# Patient Record
Sex: FEMALE | Race: OTHER | HISPANIC OR LATINO
[De-identification: names, ages, dates, MRNs, and addresses within clinical notes are randomized per-mention and may not be internally consistent; named-entity substitution may affect disease eponyms.]

---

## 2019-07-19 ENCOUNTER — APPOINTMENT (OUTPATIENT)
Dept: GASTROENTEROLOGY | Facility: CLINIC | Age: 74
End: 2019-07-19
Payer: MEDICARE

## 2019-07-19 VITALS
OXYGEN SATURATION: 99 % | WEIGHT: 134 LBS | DIASTOLIC BLOOD PRESSURE: 72 MMHG | HEIGHT: 62 IN | TEMPERATURE: 98.2 F | HEART RATE: 69 BPM | BODY MASS INDEX: 24.66 KG/M2 | SYSTOLIC BLOOD PRESSURE: 129 MMHG

## 2019-07-19 DIAGNOSIS — Z80.0 FAMILY HISTORY OF MALIGNANT NEOPLASM OF DIGESTIVE ORGANS: ICD-10-CM

## 2019-07-19 DIAGNOSIS — Z86.79 PERSONAL HISTORY OF OTHER DISEASES OF THE CIRCULATORY SYSTEM: ICD-10-CM

## 2019-07-19 DIAGNOSIS — R11.0 NAUSEA: ICD-10-CM

## 2019-07-19 DIAGNOSIS — K29.30 CHRONIC SUPERFICIAL GASTRITIS W/OUT BLEEDING: ICD-10-CM

## 2019-07-19 DIAGNOSIS — K58.0 IRRITABLE BOWEL SYNDROME WITH DIARRHEA: ICD-10-CM

## 2019-07-19 DIAGNOSIS — Z78.9 OTHER SPECIFIED HEALTH STATUS: ICD-10-CM

## 2019-07-19 DIAGNOSIS — Z87.19 PERSONAL HISTORY OF OTHER DISEASES OF THE DIGESTIVE SYSTEM: ICD-10-CM

## 2019-07-19 DIAGNOSIS — K64.8 OTHER HEMORRHOIDS: ICD-10-CM

## 2019-07-19 DIAGNOSIS — K76.0 FATTY (CHANGE OF) LIVER, NOT ELSEWHERE CLASSIFIED: ICD-10-CM

## 2019-07-19 DIAGNOSIS — R19.7 DIARRHEA, UNSPECIFIED: ICD-10-CM

## 2019-07-19 DIAGNOSIS — R07.89 OTHER CHEST PAIN: ICD-10-CM

## 2019-07-19 PROBLEM — Z00.00 ENCOUNTER FOR PREVENTIVE HEALTH EXAMINATION: Status: ACTIVE | Noted: 2019-07-19

## 2019-07-19 PROCEDURE — 99204 OFFICE O/P NEW MOD 45 MIN: CPT

## 2019-07-19 RX ORDER — DOXEPIN HYDROCHLORIDE 25 MG/1
25 CAPSULE ORAL
Qty: 60 | Refills: 0 | Status: ACTIVE | COMMUNITY
Start: 2019-02-15

## 2019-07-19 RX ORDER — AMLODIPINE BESYLATE 10 MG/1
10 TABLET ORAL
Qty: 90 | Refills: 0 | Status: ACTIVE | COMMUNITY
Start: 2019-03-30

## 2019-07-19 RX ORDER — SUCRALFATE 1 G/10ML
1 SUSPENSION ORAL
Qty: 1200 | Refills: 3 | Status: ACTIVE | COMMUNITY
Start: 2019-07-19 | End: 1900-01-01

## 2019-07-19 RX ORDER — ALPRAZOLAM 0.25 MG/1
0.25 TABLET ORAL
Qty: 90 | Refills: 0 | Status: ACTIVE | COMMUNITY
Start: 2019-06-21

## 2019-07-19 RX ORDER — AMITRIPTYLINE HYDROCHLORIDE 10 MG/1
10 TABLET, FILM COATED ORAL
Refills: 0 | Status: ACTIVE | COMMUNITY

## 2019-07-19 RX ORDER — LOSARTAN POTASSIUM 100 MG/1
100 TABLET, FILM COATED ORAL
Qty: 90 | Refills: 0 | Status: ACTIVE | COMMUNITY
Start: 2019-03-30

## 2019-07-19 NOTE — REVIEW OF SYSTEMS
[Chest Pain] : chest pain [Abdominal Pain] : abdominal pain [Vomiting] : vomiting [Diarrhea] : diarrhea [Heartburn] : heartburn [Arthralgias] : arthralgias [Anxiety] : anxiety [Depression] : depression [Hot Flashes] : hot flashes [Negative] : Heme/Lymph

## 2019-07-19 NOTE — ASSESSMENT
[FreeTextEntry1] : Abdominal Pain: The patient complains of abdominal pain. The patient is to avoid nonsteroidal anti-inflammatory drugs and aspirin.  I recommend a trial of Carafate suspension 1 gram/10 cc PO 4 times a day for 3 months for the symptoms.\par Dyspepsia: The patient complains of dyspeptic symptoms.  The patient was advised to abide by an anti-gas diet.  The patient was given a pamphlet for anti-gas.  The patient and I reviewed the anti-gas diet at length. The patient is to start on a trial of Phazyme one tablet 3 times a day p.r.n. abdominal pain and gas.\par GERD: The patient was advised to avoid late-night meals and dietary indiscretions.  The patient was advised to avoid fried and fatty foods.  The patient was advised to abide by an anti-GERD diet. The patient was given a pamphlet for anti-GERD.  The patient and I reviewed the anti-GERD diet at length. I recommend a trial of Carafate suspension 1 gram/10 cc 4 times a day x 3 months for the symptoms.\par Nausea: The patient complains of nausea. If the symptoms of nausea persists, the patient may require a trial of Zofran 8 mg twice a day. \par Diarrhea: I recommend a low residue diet. The patient is to avoid fiber supplementation. The patient is to consider starting a trial of a probiotic such as Align once a day. \par Atypical Chest Pain: The patient complains of atypical chest pain of unclear etiology.  The patient was advised to follow up with the PMD and cardiologist regarding evaluation for the atypical chest pain. The patient was told of possible etiologies such as cardiac, pulmonary, GI, musculoskeletal, stress and other causes for the atypical chest pain.  The patient agrees and will follow-up with the PMD and cardiologist. \par The patient was also advised to follow up with her psychiatrist.\par Hiatal Hernia:  The patient was advised to avoid late-night meals and dietary indiscretions.  The patient was advised to avoid fried and fatty foods.  The patient was advised to abide by an anti-GERD diet. The patient was given a pamphlet for anti-GERD.  The patient and I reviewed the anti-GERD diet at length. I recommend a trial of Carafate suspension 1 gram/10 cc 4 times a day x 3 months for the symptoms.\par Gastritis: The patient has a history of gastritis. The patient is to avoid nonsteroidal anti-inflammatory drugs and aspirin. I recommend a trial of Carafate suspension 1 gram/10 cc 4 times a day x 3 months for the symptoms.\par Internal Hemorrhoids: The patient is to be started on a trial of Anusol H. C. suppositories one per rectum nightly and Anusol HC2 .5% cream apply to affected area twice a day p.r.n. hemorrhoidal bleeding or pain. \par Fatty Liver:  The patient had an imaging study (MRI) suggestive of fatty infiltration of the liver.  The patient denies any jaundice or pruritus.  The patient denies any alcohol use.  The patient denies taking large doses of nonsteroidal anti-inflammatory drugs or acetaminophen.  The findings are suggestive of fatty liver.  The patient and I had a long discussion regarding the risks of fatty liver and possible progression to cirrhosis.  The patient was told of the possible increased risk of developing liver failure, cirrhosis, ascites, GI bleeding secondary to varices, hepatic encephalopathy, bleeding tendencies and liver cancer.  The patient was told of the importance of follow-up.  The patient was advised to follow up every 6 months for blood work and imaging studies. The patient agreed and will follow up. The patient was advised to lose weight. I recommend a trial of vitamin E supplementation for the fatty liver.  If the liver enzymes remain elevated, the patient may require a trial of Pioglitazone for the fatty liver. I recommend avoid alcohol and hepato-toxic agents.  The patient was also advised to avoid NSAIDs, Acetaminophen and any other hepatotoxic drugs. The patient was also advised not to share needles, razors, scissors, nail clippers, etc.. The patient is to continue close follow-up in our office for blood work and exams.  If the liver enzymes remain elevated, the patient may require a CT guided liver biopsy to assess the liver parenchyma for possible treatment.  We had a long discussion regarding the risks and benefits of the procedure.  The patient was told of the risks of bleeding, perforation, infections, emergency surgery and missing lesions.  The patient agreed and will follow-up to reassess the symptoms.  I recommend a CBC, SMA 24, amylase, lipase, ESR, TFTs, ANATOLY, rheumatoid factor, celiac sprue panel, IgA, profile for hepatitis A, B, C. ,AFP, alpha 1 anti-trypsin  antibody, ceruloplasmin level, iron, TIBC, ferritin level, AMA, anti smooth muscle antibody and PT/INR/PTT in 6 months.  I recommend an abdominal ultrasound of the liver to assess the liver parenchyma and for liver lesions in 6 months.\par Irritable Bowel Syndrome: The patient is to be started on a trial of Phazyme 1 tablet 3 times a day p.r.n. abdominal pain.  The patient was also advised to follow a low FOD-MAP diet for the irritable bowel syndrome.  if the symptoms persist, the patient  may require a trial of Xifaxin for possible post infectious irritable bowel syndrome. \par Upper Endoscopy: I recommend an upper endoscopy to assess the symptoms.  The patient was told of the risks and benefits of the procedure.  The patient was told of the risks of perforation, emergency surgery, bleeding, infections and missed lesions.  The patient agreed and will schedule for the procedure. The patient can take the antihypertensive medication with a sip of water one hour prior to the procedure.  The patient is to be n.p.o. after midnight.  The patient is to return for the procedure. \par Follow-up: The patient is to follow-up in the office in 2 to 3 weeks to reassess the symptoms. The patient was told to call the office if any further problems. \par \par \par \par \par

## 2019-07-19 NOTE — HISTORY OF PRESENT ILLNESS
[None] : had no significant interval events [Vomiting] : denies vomiting [Constipation] : denies constipation [Yellow Skin Or Eyes (Jaundice)] : denies jaundice [Rectal Pain] : denies rectal pain [Heartburn] : heartburn [Nausea] : nausea [Diarrhea] : diarrhea [Abdominal Pain] : abdominal pain [Abdominal Swelling] : abdominal swelling [GERD] : gastroesophageal reflux disease [Irritable Bowel Syndrome] : irritable bowel syndrome [Malignancy] : malignancy [Cholecystectomy] : cholecystectomy [Wt Gain ___ Lbs] : no recent weight gain [Wt Loss ___ Lbs] : no recent weight loss [Hiatus Hernia] : no hiatus hernia [Peptic Ulcer Disease] : no peptic ulcer disease [Pancreatitis] : no pancreatitis [Cholelithiasis] : no cholelithiasis [Kidney Stone] : no kidney stone [Inflammatory Bowel Disease] : no inflammatory bowel disease [Diverticulitis] : no diverticulitis [Alcohol Abuse] : no alcohol abuse [Abdominal Surgery] : no abdominal surgery [Appendectomy] : no appendectomy [de-identified] : The patient denies any prior exposure to hepatitis A, B or C.  The patient denies any large doses of nonsteroidal anti-inflammatory drugs or acetaminophen.   The patient denies sharing needles, razors, nail clippers, nail files, scissors, et cetera.  The patient denies any EtOH abuse, cocaine use or intravenous drug use.   The patient denies any prior blood transfusions, sexual indiscretions, tattoos or piercing.  The patient admits to having prior surgery.  The history of surgery is significant for a prior cholecystectomy and hysterectomy.   The patient denies any family history of GI or liver problems.   [de-identified] : The patient is a 74-year-old  female with past medical history significant for hypertension who was referred to my office by Dr. María Gallagher for abdominal pain, dyspepsia, gastroesophageal reflux disease, atypical chest pain and nausea. The patient also admits to having diarrhea, change in bowel habits and change in caliber of stool. I was asked to render an opinion for consultation for the above complaints.   The patient states that she is feeling uncomfortable x 2 years.  The patient denies any prior exposure to hepatitis A, B or C.  The patient denies any large doses of nonsteroidal anti-inflammatory drugs or acetaminophen.   The patient denies sharing needles, razors, nail clippers, nail files, scissors, et cetera.  The patient denies any EtOH abuse, cocaine use or intravenous drug use.   The patient denies any prior blood transfusions, sexual indiscretions, tattoos or piercing.  The patient admits to having prior surgery.  The history of surgery is significant for a prior cholecystectomy and hysterectomy.   The patient admits to a family history of GI problems.  The patient’s paternal aunt had a history of gastric and reflux issue. The patient complains of abdominal pain.  The patient describes the abdominal pain as a, crampy, burning, constant midepigastric and periumbilical discomfort that is nonradiating in nature.  The abdominal pain is unrelated to meals or passing gas or having bowel movements.  The abdominal pain is described as being moderate in nature.  The abdominal pain occurs in the morning and during the day.  The abdominal pain can occur at any time.   The abdominal pain never has awakened the patient from sleep.  The abdominal pain is slightly relieved with certain medication such as Xanax and Pepcid.  The abdominal pain is associated with abdominal gas and bloating.  The patient complains of nausea but denies any vomiting.   The patient complains of gastroesophageal reflux disease but denies any dysphagia. The gastroesophageal reflux disease is worse after meals and late at night and in the early morning. The gastroesophageal reflux disease is minimally improved with proton pump inhibitors, H2 blockers and antacids.  The patient complains of atypical chest pain but denies any shortness of breath or palpitations.  The chest pain is described as a crampy, burning, intermittent substernal discomfort that is nonradiating in nature.  The patient admits to occasional episodes of diaphoresis.  The chest pain is described as being 6 out of 10 in intensity.  The chest pain can occur at any time.  The chest pain is worse in the morning.  The chest pain is worse with stress.  The chest pain is unrelated to meals and passing gas.  The chest pain never awakened the patient from sleep.  The patient complains of diarrhea but denies any constipation.  The patient has 2 to 3 bowel movements a day.  The patient complains of a change in bowel habits.  The patient complains of a change in caliber of stool.   The diarrhea is described as soft in nature.  The patient denies having mucus discharge with the bowel movements.  The patient denies any bright red blood per rectum, melena or hematemesis.  The patient denies any rectal pain or rectal pruritus. The patient complains of weight loss but denies any anorexia.  The patient admits to losing 5 pounds over the past 7 months.  She denies any fevers or chills.  The patient denies any jaundice or pruritus.  The patient denies any back pain.  The patient admits to having a prior upper endoscopy and colonoscopy performed by another gastroenterologist, Dr. Kuldip Alston.    The upper endoscopy performed 2017 revealed a small hiatal hernia and moderate gastritis. The pathology revealed gastric antral mucosa with minimal chronic inactive gastritis that was negative for Helicobacter pylori. The colonoscopy to the cecum performed on 2017 revealed a normal colon and internal hemorrhoids.  The abdominal ultrasound performed on 2017 revealed status post cholecystectomy, dilated common bile duct which could be secondary to the previous biliary surgery and no evidence of renal pathology.  The MRI of the abdomen and pancreas with and without IV contrast revealed hepatic steatosis, mild intrahepatic ductal dilatation with prominent common bile duct measuring 8 mm likely related to postcholecystectomy state, status post cholecystectomy with no evidence of choledocholithiasis. The patient's last menstrual period was age 40, s/p hysterectomy an oophorectomy. The patient is a .  The patient's first menstrual period was at age 12. The patient admits to a family history of GI problems.  The patient’s paternal aunt had a history of gastric and reflux issue.

## 2019-07-22 LAB — HEMOCCULT STL QL: NEGATIVE

## 2019-08-13 ENCOUNTER — RX CHANGE (OUTPATIENT)
Age: 74
End: 2019-08-13

## 2019-08-13 RX ORDER — POLYETHYLENE GLYCOL 3350, SODIUM CHLORIDE, SODIUM BICARBONATE AND POTASSIUM CHLORIDE WITH LEMON FLAVOR 420; 11.2; 5.72; 1.48 G/4L; G/4L; G/4L; G/4L
420 POWDER, FOR SOLUTION ORAL
Qty: 1 | Refills: 0 | Status: ACTIVE | COMMUNITY
Start: 2019-08-13 | End: 1900-01-01

## 2019-08-15 ENCOUNTER — OUTPATIENT (OUTPATIENT)
Dept: OUTPATIENT SERVICES | Facility: HOSPITAL | Age: 74
LOS: 1 days | End: 2019-08-15
Payer: COMMERCIAL

## 2019-08-15 ENCOUNTER — RESULT REVIEW (OUTPATIENT)
Age: 74
End: 2019-08-15

## 2019-08-15 ENCOUNTER — APPOINTMENT (OUTPATIENT)
Dept: GASTROENTEROLOGY | Facility: HOSPITAL | Age: 74
End: 2019-08-15

## 2019-08-15 DIAGNOSIS — K21.9 GASTRO-ESOPHAGEAL REFLUX DISEASE WITHOUT ESOPHAGITIS: ICD-10-CM

## 2019-08-15 DIAGNOSIS — K44.9 DIAPHRAGMATIC HERNIA WITHOUT OBSTRUCTION OR GANGRENE: ICD-10-CM

## 2019-08-15 PROCEDURE — 45380 COLONOSCOPY AND BIOPSY: CPT

## 2019-08-15 PROCEDURE — 88312 SPECIAL STAINS GROUP 1: CPT

## 2019-08-15 PROCEDURE — 43239 EGD BIOPSY SINGLE/MULTIPLE: CPT

## 2019-08-15 PROCEDURE — 88305 TISSUE EXAM BY PATHOLOGIST: CPT | Mod: 26

## 2019-08-15 PROCEDURE — 88305 TISSUE EXAM BY PATHOLOGIST: CPT

## 2019-08-15 PROCEDURE — 88312 SPECIAL STAINS GROUP 1: CPT | Mod: 26

## 2019-08-19 LAB — SURGICAL PATHOLOGY STUDY: SIGNIFICANT CHANGE UP

## 2019-08-28 ENCOUNTER — OUTPATIENT (OUTPATIENT)
Dept: OUTPATIENT SERVICES | Facility: HOSPITAL | Age: 74
LOS: 1 days | Discharge: HOME | End: 2019-08-28
Payer: MEDICARE

## 2019-08-28 PROCEDURE — 90792 PSYCH DIAG EVAL W/MED SRVCS: CPT

## 2019-08-29 DIAGNOSIS — F41.1 GENERALIZED ANXIETY DISORDER: ICD-10-CM

## 2019-08-29 DIAGNOSIS — F32.9 MAJOR DEPRESSIVE DISORDER, SINGLE EPISODE, UNSPECIFIED: ICD-10-CM

## 2019-11-11 ENCOUNTER — APPOINTMENT (OUTPATIENT)
Dept: GASTROENTEROLOGY | Facility: CLINIC | Age: 74
End: 2019-11-11

## 2020-10-05 ENCOUNTER — APPOINTMENT (OUTPATIENT)
Dept: GASTROENTEROLOGY | Facility: CLINIC | Age: 75
End: 2020-10-05

## 2021-04-16 ENCOUNTER — EMERGENCY (EMERGENCY)
Facility: HOSPITAL | Age: 76
LOS: 1 days | Discharge: HOME | End: 2021-04-16
Attending: EMERGENCY MEDICINE
Payer: COMMERCIAL

## 2021-04-16 VITALS
HEART RATE: 79 BPM | DIASTOLIC BLOOD PRESSURE: 79 MMHG | TEMPERATURE: 98 F | OXYGEN SATURATION: 99 % | RESPIRATION RATE: 20 BRPM | SYSTOLIC BLOOD PRESSURE: 177 MMHG | HEIGHT: 62.6 IN | WEIGHT: 136.03 LBS

## 2021-04-16 DIAGNOSIS — Z90.49 ACQUIRED ABSENCE OF OTHER SPECIFIED PARTS OF DIGESTIVE TRACT: Chronic | ICD-10-CM

## 2021-04-16 DIAGNOSIS — Z90.710 ACQUIRED ABSENCE OF BOTH CERVIX AND UTERUS: Chronic | ICD-10-CM

## 2021-04-16 LAB
ALBUMIN SERPL ELPH-MCNC: 4.3 G/DL — SIGNIFICANT CHANGE UP (ref 3.3–5)
ALP SERPL-CCNC: 84 U/L — SIGNIFICANT CHANGE UP (ref 40–120)
ALT FLD-CCNC: 30 U/L — SIGNIFICANT CHANGE UP (ref 10–45)
ANION GAP SERPL CALC-SCNC: 12 MMOL/L — SIGNIFICANT CHANGE UP (ref 5–17)
APTT BLD: 29.9 SEC — SIGNIFICANT CHANGE UP (ref 27.5–35.5)
AST SERPL-CCNC: 21 U/L — SIGNIFICANT CHANGE UP (ref 10–40)
BASOPHILS # BLD AUTO: 0.05 K/UL — SIGNIFICANT CHANGE UP (ref 0–0.2)
BASOPHILS NFR BLD AUTO: 0.6 % — SIGNIFICANT CHANGE UP (ref 0–2)
BILIRUB SERPL-MCNC: 0.2 MG/DL — SIGNIFICANT CHANGE UP (ref 0.2–1.2)
BUN SERPL-MCNC: 16 MG/DL — SIGNIFICANT CHANGE UP (ref 7–23)
CALCIUM SERPL-MCNC: 9.4 MG/DL — SIGNIFICANT CHANGE UP (ref 8.4–10.5)
CHLORIDE SERPL-SCNC: 105 MMOL/L — SIGNIFICANT CHANGE UP (ref 96–108)
CO2 SERPL-SCNC: 24 MMOL/L — SIGNIFICANT CHANGE UP (ref 22–31)
CREAT SERPL-MCNC: 0.69 MG/DL — SIGNIFICANT CHANGE UP (ref 0.5–1.3)
EOSINOPHIL # BLD AUTO: 0.04 K/UL — SIGNIFICANT CHANGE UP (ref 0–0.5)
EOSINOPHIL NFR BLD AUTO: 0.5 % — SIGNIFICANT CHANGE UP (ref 0–6)
GAS PNL BLDV: SIGNIFICANT CHANGE UP
GLUCOSE SERPL-MCNC: 147 MG/DL — HIGH (ref 70–99)
HCT VFR BLD CALC: 42.5 % — SIGNIFICANT CHANGE UP (ref 34.5–45)
HGB BLD-MCNC: 14.3 G/DL — SIGNIFICANT CHANGE UP (ref 11.5–15.5)
IMM GRANULOCYTES NFR BLD AUTO: 0.4 % — SIGNIFICANT CHANGE UP (ref 0–1.5)
INR BLD: 1.1 RATIO — SIGNIFICANT CHANGE UP (ref 0.88–1.16)
LIDOCAIN IGE QN: 53 U/L — SIGNIFICANT CHANGE UP (ref 7–60)
LYMPHOCYTES # BLD AUTO: 1.67 K/UL — SIGNIFICANT CHANGE UP (ref 1–3.3)
LYMPHOCYTES # BLD AUTO: 19.8 % — SIGNIFICANT CHANGE UP (ref 13–44)
MCHC RBC-ENTMCNC: 30.5 PG — SIGNIFICANT CHANGE UP (ref 27–34)
MCHC RBC-ENTMCNC: 33.6 GM/DL — SIGNIFICANT CHANGE UP (ref 32–36)
MCV RBC AUTO: 90.6 FL — SIGNIFICANT CHANGE UP (ref 80–100)
MONOCYTES # BLD AUTO: 0.47 K/UL — SIGNIFICANT CHANGE UP (ref 0–0.9)
MONOCYTES NFR BLD AUTO: 5.6 % — SIGNIFICANT CHANGE UP (ref 2–14)
NEUTROPHILS # BLD AUTO: 6.18 K/UL — SIGNIFICANT CHANGE UP (ref 1.8–7.4)
NEUTROPHILS NFR BLD AUTO: 73.1 % — SIGNIFICANT CHANGE UP (ref 43–77)
NRBC # BLD: 0 /100 WBCS — SIGNIFICANT CHANGE UP (ref 0–0)
PLATELET # BLD AUTO: 306 K/UL — SIGNIFICANT CHANGE UP (ref 150–400)
POTASSIUM SERPL-MCNC: 3.8 MMOL/L — SIGNIFICANT CHANGE UP (ref 3.5–5.3)
POTASSIUM SERPL-SCNC: 3.8 MMOL/L — SIGNIFICANT CHANGE UP (ref 3.5–5.3)
PROT SERPL-MCNC: 7.7 G/DL — SIGNIFICANT CHANGE UP (ref 6–8.3)
PROTHROM AB SERPL-ACNC: 13.1 SEC — SIGNIFICANT CHANGE UP (ref 10.6–13.6)
RBC # BLD: 4.69 M/UL — SIGNIFICANT CHANGE UP (ref 3.8–5.2)
RBC # FLD: 12.4 % — SIGNIFICANT CHANGE UP (ref 10.3–14.5)
SODIUM SERPL-SCNC: 141 MMOL/L — SIGNIFICANT CHANGE UP (ref 135–145)
TROPONIN T, HIGH SENSITIVITY RESULT: <6 NG/L — SIGNIFICANT CHANGE UP (ref 0–51)
WBC # BLD: 8.44 K/UL — SIGNIFICANT CHANGE UP (ref 3.8–10.5)
WBC # FLD AUTO: 8.44 K/UL — SIGNIFICANT CHANGE UP (ref 3.8–10.5)

## 2021-04-16 PROCEDURE — 93010 ELECTROCARDIOGRAM REPORT: CPT

## 2021-04-16 PROCEDURE — 99284 EMERGENCY DEPT VISIT MOD MDM: CPT

## 2021-04-16 PROCEDURE — 71045 X-RAY EXAM CHEST 1 VIEW: CPT | Mod: 26

## 2021-04-16 PROCEDURE — 74174 CTA ABD&PLVS W/CONTRAST: CPT | Mod: 26,MA

## 2021-04-16 PROCEDURE — 99213 OFFICE O/P EST LOW 20 MIN: CPT

## 2021-04-16 RX ORDER — FAMOTIDINE 10 MG/ML
20 INJECTION INTRAVENOUS ONCE
Refills: 0 | Status: COMPLETED | OUTPATIENT
Start: 2021-04-16 | End: 2021-04-16

## 2021-04-16 RX ORDER — ACETAMINOPHEN 500 MG
975 TABLET ORAL ONCE
Refills: 0 | Status: COMPLETED | OUTPATIENT
Start: 2021-04-16 | End: 2021-04-16

## 2021-04-16 RX ADMIN — Medication 30 MILLILITER(S): at 23:27

## 2021-04-16 RX ADMIN — Medication 975 MILLIGRAM(S): at 20:33

## 2021-04-16 RX ADMIN — FAMOTIDINE 20 MILLIGRAM(S): 10 INJECTION INTRAVENOUS at 23:27

## 2021-04-16 NOTE — ED PROVIDER NOTE - ATTENDING CONTRIBUTION TO CARE
attending Raul: 76yF h/o HTN p/w post prandial epigastric pain, intermittent, x 1 month, worse over last 2 days. Currently without pain. Concern for mesenteric ischemia vs possible ulcer. Will obtain labs including lactate, CTA abdomen/pel, reassess

## 2021-04-16 NOTE — ED ADULT NURSE NOTE - OBJECTIVE STATEMENT
75 yo F pmh of ... ambulated to ED c/o epigastric pain since yesterday worse after eating food.  Pt also endorses nausea intermittently.  Denies CP, back pain, SOB, fevers/chills, n/v/d, lightheadedness, dizziness, changes in urinary or bowel habits.  A&Ox4, gross neuro intact, abdomen soft, nondistended, tender to palpation in the epigastric area.  Skin w/d/i.  Safety and comfort maintained. will continue to monitor.

## 2021-04-16 NOTE — ED PROVIDER NOTE - RAPID ASSESSMENT
76y F p/w midline abd pain from epigastric to lower abd x 2 days that is a/w eating. Denies F/C, vomiting, melena, blood in stool, diarrhea, dysuria, hematuria, CP, SOB, jaw pain, arm pain. No daily    medications. No EtOH use. She has had similar episodes before. No h/o HTN, DM. Allergic to codeine.    IDon (Scribe), have documented this rapid assessment note under the dictation of Eric Abreu (HAYDER), which has been reviewed and affirmed to be accurate.  Patient was seen in the waiting room as a QPA patient. The patient will be seen and further worked up in the main emergency department and their care will be completed by the main emergency department team along with a thorough physical exam. Receiving team will follow up on labs, analgesia, any clinical imaging, reassess and disposition as clinically indicated, all decisions regarding the progression of care will be made at their discretion.    Scribe Statement: IDon, attest that this documentation has been prepared under the direction and in the presence of Eric Abreu (HAYDER) 76y F p/w midline abd pain from epigastric to lower abd x 2 days that is a/w eating. Denies F/C, vomiting, melena, blood in stool, diarrhea, dysuria, hematuria, CP, SOB, jaw pain, arm pain. No daily    medications. No EtOH use. She has had similar episodes before. No h/o HTN, DM. Allergic to codeine.    IDon (Scribe), have documented this rapid assessment note under the dictation of Eric Abreu (HAYDER), which has been reviewed and affirmed to be accurate.  Patient was seen in the waiting room as a QPA patient. The patient will be seen and further worked up in the main emergency department and their care will be completed by the main emergency department team along with a thorough physical exam. Receiving team will follow up on labs, analgesia, any clinical imaging, reassess and disposition as clinically indicated, all decisions regarding the progression of care will be made at their discretion.    Scribe Statement: IDon, attest that this documentation has been prepared under the direction and in the presence of Eric Abreu (HAYDER)    Eric OLSON PA-C, personally performed the service described in the documentation recorded by the scribe in my presence, and it accurately and completely records my words and actions.

## 2021-04-16 NOTE — ED PROVIDER NOTE - PHYSICAL EXAMINATION
PHYSICAL EXAM:   General: well-appearing, appears stated age, not in extremis   HEENT: NC/AT, airway patent  Cardiovascular: regular rate   Respiratory:  nonlabored respirations  Abdominal: soft, nontender, nondistended, no rebound, guarding or rigidity  Neuro: awake, alert, interactive  Psychiatric: appropriate mood and affect.   -Mei Chacon PGY-3

## 2021-04-16 NOTE — ED PROVIDER NOTE - PROGRESS NOTE DETAILS
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 OBGYN paged. Schuyler Hurtado, DO PGY-1: As per OBGYN no surgical workup indicated at this time Mei Chacon MD PGY-3 results of CT discussed with patient, including ovarian vein thrombus and breast nodule. patient states she had a breast nodule biopsied x 20 years ago which was unremarkable at that time. possibly will keep in cdu for heme consult/initiation of AC

## 2021-04-16 NOTE — ED ADULT NURSE NOTE - CAS EDN DISCHARGE INTERVENTIONS
Have her take the 2 pills as early in day as possible and see if this helps  
I called and advised patient that she should  take the 2 pills as early in day as possible and see if this helps.  Patient verbalized understanding.   
Pt would like to know what time of day she should be taking Fluoxetine 10 mg, qty of 2?  Pt can't sleep, and she is taking this at 7:00 PM.        
No
IV discontinued, cath removed intact

## 2021-04-16 NOTE — ED PROVIDER NOTE - OBJECTIVE STATEMENT
Mei Chacon MD PGY-3 75 yo F with PMH HTN presents with epigastric abdominal pain, intermittent, worse with eating, over the last month, worse over the last 2 days. Endorses 6 lb weight loss over the last few months. No melena, no hematochezia. Endorses nausea but no vomiting. endorses hx of GERD but states this feels different. Also endorses "bitter" taste in mouth over the last few days. Significant abdominal surgeries include hysterectomy many years ago and cholecystectomy

## 2021-04-16 NOTE — ED PROVIDER NOTE - NS ED ROS FT
REVIEW OF SYSTEMS:  General:  no fever, no chills  Cardiac: no chest pain  Respiratory: no shortness of breath  Gastrointestinal: + abdominal pain, + nausea, no vomiting, no diarrhea, no melena, no hematochezia   : no hematuria, no dysuria or urinary frequency  -Mei Chacon PGY-3

## 2021-04-17 VITALS
OXYGEN SATURATION: 97 % | SYSTOLIC BLOOD PRESSURE: 138 MMHG | DIASTOLIC BLOOD PRESSURE: 73 MMHG | HEART RATE: 63 BPM | TEMPERATURE: 98 F | RESPIRATION RATE: 17 BRPM

## 2021-04-17 LAB
APPEARANCE UR: CLEAR — SIGNIFICANT CHANGE UP
BACTERIA # UR AUTO: NEGATIVE — SIGNIFICANT CHANGE UP
BILIRUB UR-MCNC: NEGATIVE — SIGNIFICANT CHANGE UP
COLOR SPEC: COLORLESS — SIGNIFICANT CHANGE UP
CULTURE RESULTS: SIGNIFICANT CHANGE UP
DIFF PNL FLD: ABNORMAL
EPI CELLS # UR: 0 /HPF — SIGNIFICANT CHANGE UP
GLUCOSE UR QL: NEGATIVE — SIGNIFICANT CHANGE UP
HAV IGM SER-ACNC: SIGNIFICANT CHANGE UP
HBV CORE IGM SER-ACNC: SIGNIFICANT CHANGE UP
HBV SURFACE AG SER-ACNC: SIGNIFICANT CHANGE UP
HCV AB S/CO SERPL IA: 0.16 S/CO — SIGNIFICANT CHANGE UP (ref 0–0.99)
HCV AB SERPL-IMP: SIGNIFICANT CHANGE UP
KETONES UR-MCNC: NEGATIVE — SIGNIFICANT CHANGE UP
LEUKOCYTE ESTERASE UR-ACNC: ABNORMAL
NITRITE UR-MCNC: NEGATIVE — SIGNIFICANT CHANGE UP
PH UR: 7 — SIGNIFICANT CHANGE UP (ref 5–8)
PROT UR-MCNC: NEGATIVE — SIGNIFICANT CHANGE UP
RBC CASTS # UR COMP ASSIST: 19 /HPF — HIGH (ref 0–4)
SARS-COV-2 RNA SPEC QL NAA+PROBE: SIGNIFICANT CHANGE UP
SP GR SPEC: 1.01 — SIGNIFICANT CHANGE UP (ref 1.01–1.02)
SPECIMEN SOURCE: SIGNIFICANT CHANGE UP
UROBILINOGEN FLD QL: NEGATIVE — SIGNIFICANT CHANGE UP
WBC UR QL: 1 /HPF — SIGNIFICANT CHANGE UP (ref 0–5)

## 2021-04-17 PROCEDURE — 85014 HEMATOCRIT: CPT

## 2021-04-17 PROCEDURE — 83605 ASSAY OF LACTIC ACID: CPT

## 2021-04-17 PROCEDURE — 82565 ASSAY OF CREATININE: CPT

## 2021-04-17 PROCEDURE — 96375 TX/PRO/DX INJ NEW DRUG ADDON: CPT | Mod: XU

## 2021-04-17 PROCEDURE — 71045 X-RAY EXAM CHEST 1 VIEW: CPT

## 2021-04-17 PROCEDURE — 85018 HEMOGLOBIN: CPT

## 2021-04-17 PROCEDURE — 76856 US EXAM PELVIC COMPLETE: CPT | Mod: 26

## 2021-04-17 PROCEDURE — 99285 EMERGENCY DEPT VISIT HI MDM: CPT | Mod: 25

## 2021-04-17 PROCEDURE — 85730 THROMBOPLASTIN TIME PARTIAL: CPT

## 2021-04-17 PROCEDURE — 74174 CTA ABD&PLVS W/CONTRAST: CPT

## 2021-04-17 PROCEDURE — 93005 ELECTROCARDIOGRAM TRACING: CPT

## 2021-04-17 PROCEDURE — 76830 TRANSVAGINAL US NON-OB: CPT

## 2021-04-17 PROCEDURE — 99236 HOSP IP/OBS SAME DATE HI 85: CPT

## 2021-04-17 PROCEDURE — 81001 URINALYSIS AUTO W/SCOPE: CPT

## 2021-04-17 PROCEDURE — 80074 ACUTE HEPATITIS PANEL: CPT

## 2021-04-17 PROCEDURE — G0378: CPT

## 2021-04-17 PROCEDURE — 82803 BLOOD GASES ANY COMBINATION: CPT

## 2021-04-17 PROCEDURE — 82947 ASSAY GLUCOSE BLOOD QUANT: CPT

## 2021-04-17 PROCEDURE — 84295 ASSAY OF SERUM SODIUM: CPT

## 2021-04-17 PROCEDURE — 84132 ASSAY OF SERUM POTASSIUM: CPT

## 2021-04-17 PROCEDURE — 96374 THER/PROPH/DIAG INJ IV PUSH: CPT | Mod: XU

## 2021-04-17 PROCEDURE — 76830 TRANSVAGINAL US NON-OB: CPT | Mod: 26

## 2021-04-17 PROCEDURE — 82330 ASSAY OF CALCIUM: CPT

## 2021-04-17 PROCEDURE — 83690 ASSAY OF LIPASE: CPT

## 2021-04-17 PROCEDURE — 84484 ASSAY OF TROPONIN QUANT: CPT

## 2021-04-17 PROCEDURE — 85610 PROTHROMBIN TIME: CPT

## 2021-04-17 PROCEDURE — 76856 US EXAM PELVIC COMPLETE: CPT

## 2021-04-17 PROCEDURE — 85025 COMPLETE CBC W/AUTO DIFF WBC: CPT

## 2021-04-17 PROCEDURE — 87635 SARS-COV-2 COVID-19 AMP PRB: CPT

## 2021-04-17 PROCEDURE — 80053 COMPREHEN METABOLIC PANEL: CPT

## 2021-04-17 PROCEDURE — 87086 URINE CULTURE/COLONY COUNT: CPT

## 2021-04-17 PROCEDURE — 82435 ASSAY OF BLOOD CHLORIDE: CPT

## 2021-04-17 RX ORDER — APIXABAN 2.5 MG/1
1 TABLET, FILM COATED ORAL
Qty: 60 | Refills: 0
Start: 2021-04-17 | End: 2021-05-16

## 2021-04-17 RX ORDER — ONDANSETRON 8 MG/1
4 TABLET, FILM COATED ORAL ONCE
Refills: 0 | Status: COMPLETED | OUTPATIENT
Start: 2021-04-17 | End: 2021-04-17

## 2021-04-17 RX ORDER — ALPRAZOLAM 0.25 MG
0.25 TABLET ORAL ONCE
Refills: 0 | Status: DISCONTINUED | OUTPATIENT
Start: 2021-04-17 | End: 2021-04-17

## 2021-04-17 RX ADMIN — ONDANSETRON 4 MILLIGRAM(S): 8 TABLET, FILM COATED ORAL at 08:01

## 2021-04-17 RX ADMIN — Medication 0.25 MILLIGRAM(S): at 09:33

## 2021-04-17 NOTE — ED CDU PROVIDER DISPOSITION NOTE - CLINICAL COURSE
75 yo F with PMH HTN presents with epigastric abdominal pain, intermittent, worse with eating, over the last month, worse over the last 2 days. Endorses 6 lb weight loss over the last few months. No melena, no hematochezia. Endorses nausea but no vomiting. endorses hx of GERD but states this feels different. Also endorses "bitter" taste in mouth over the last few days. Significant abdominal surgeries include hysterectomy many years ago and cholecystectomy  In ED pt as found to have an acute appearing multifocal nonocclusive thrombus in the right ovarian vein as well as a left breast mass (nodule vs cyst). labs unrevealing. OBGYN was consulted, pt went to CDU for further eval including hematology consult for recommendation on anticoagulation + hypercoagulable workup  In CDU, 77 yo F with PMH HTN presents with epigastric abdominal pain, intermittent, worse with eating, over the last month, worse over the last 2 days. Endorses 6 lb weight loss over the last few months. No melena, no hematochezia. Endorses nausea but no vomiting. endorses hx of GERD but states this feels different. Also endorses "bitter" taste in mouth over the last few days. Significant abdominal surgeries include hysterectomy many years ago and cholecystectomy  In ED pt as found to have an acute appearing multifocal nonocclusive thrombus in the right ovarian vein as well as a left breast mass (nodule vs cyst). labs unrevealing. OBGYN was consulted, pt went to CDU for further eval including hematology consult for recommendation on anticoagulation + hypercoagulable workup  In CDU, pt seen and evaluated by Hem/Onc- pt to start Eliquis 5mg BID for 3 months with close follow up at [facility], Outpt Mammo and Breast US along with Abd MRI for liver lesions. Transvaginal US- unable to visualize the ovaries. Plan of care discussed with pt and Dr. Rogers and stable for discharge.

## 2021-04-17 NOTE — ED CDU PROVIDER DISPOSITION NOTE - PATIENT PORTAL LINK FT
You can access the FollowMyHealth Patient Portal offered by [facility] by registering at the following website: [email or web address]. By joining [employer]’s FollowMyHealth portal, you will also be able to view your health information using other applications (apps) compatible with our system.

## 2021-04-17 NOTE — ED CDU PROVIDER INITIAL DAY NOTE - NS ED ROS FT
REVIEW OF SYSTEMS:  General:  no fever, no chills  Cardiac: no chest pain  Respiratory: no shortness of breath  Gastrointestinal: + abdominal pain, no nausea, no vomiting, no diarrhea, no melena, no hematochezia   : no hematuria, no dysuria or urinary frequency

## 2021-04-17 NOTE — ED CDU PROVIDER DISPOSITION NOTE - NSFOLLOWUPINSTRUCTIONS_ED_ALL_ED_FT
Follow up with OBGYN as directed  Follow up with hematology as directed    *Bring all printed lab/test results to your appointment(s).*    Take all medicines as prescribed    Return to the ED for worsening pain, nausea/vomiting, or any other concerns. Follow up with OBGYN [phone] as directed and Breast clinic [phone] for outpatient Mammogram and ultrasound.   Follow up with hematology as directed at [facility] for outpatient abdominal MRI to better look at the liver lesions.     [facility]  Hematology/Oncology  [address] [place] [address]  Phone: [phone]    Start eliquis 5mg  twice a day until follow up with [facility].     *Bring all printed lab/test results to your appointment(s).*    Take all medicines as prescribed    Return to the ED for worsening pain, nausea/vomiting, or any other concerns.

## 2021-04-17 NOTE — CONSULT NOTE ADULT - SUBJECTIVE AND OBJECTIVE BOX
YOGI LEVINE  76y  Female [id number]    HPI: 77yo P3 LMP>30years s/p Total Laparoscopic Hysterectomy Bilateral Salpingectomy in [facility] for hyperplasia presents with worsening abdominal/epigastric pain and reflux. Patient has no pelvic complaints at this time. Denies fevers, chills, CP, SOB, dysuria or vaginal bleeding.    Per Patient  - Colonoscopy last year wnl, f/u 5 years  - Mammogram s/p bx last year wnl    Name of GYN Physician: None    POB:  NSVDx3  Pgyn:  s/p Total Laparoscopic Hysterectomy Bilateral Salpingectomy in [facility] for hyperplasia    Allergies  Tylenol with Codeine (Unknown)    PAST MEDICAL & SURGICAL HISTORY:  HTN (hypertension)    H/O: hysterectomy    History of cholecystectomy    Social History:  Denies smoking use, drug use, alcohol use.     Vital Signs Last 24 Hrs  T(C): 36.6 (2021 21:18), Max: 36.8 (2021 20:10)  T(F): 97.9 (2021 21:18), Max: 98.2 (2021 20:10)  HR: 76 (2021 21:18) (76 - 79)  BP: 172/77 (2021 21:18) (172/77 - 177/79)  BP(mean): --  RR: 17 (2021 21:18) (17 - 20)  SpO2: 97% (2021 21:18) (97% - 99%)    Physical Exam:   General: sitting comftorably in bed, NAD   HEENT: neck supple, full ROM  CV: RR S1S2 no m/r/g  Lungs: CTA b/l, good air flow b/l   Back: No CVA tenderness  Abd: Soft, non-tender, non-distended.  Bowel sounds present.    :  No bleeding on pad.    External labia wnl.   Ext: non-tender b/l, no edema     LABS:                          14.3   8.44  )-----------( 306      ( 2021 20:43 )             42.5     04-16    141  |  105  |  16  ----------------------------<  147<H>  3.8   |  24  |  0.69    Ca    9.4      2021 20:43    TPro  7.7  /  Alb  4.3  /  TBili  0.2  /  DBili  x   /  AST  21  /  ALT  30  /  AlkPhos  84      I&O's Detail    PT/INR - ( 2021 22:23 )   PT: 13.1 sec;   INR: 1.10 ratio    PTT - ( 2021 22:23 )  PTT:29.9 sec  Urinalysis Basic - ( 2021 23:53 )    Color: Colorless / Appearance: Clear / S.013 / pH: x  Gluc: x / Ketone: Negative  / Bili: Negative / Urobili: Negative   Blood: x / Protein: Negative / Nitrite: Negative   Leuk Esterase: Small / RBC: 19 /hpf / WBC 1 /HPF   Sq Epi: x / Non Sq Epi: 0 /hpf / Bacteria: Negative      RADIOLOGY & ADDITIONAL STUDIES:    < from: CT Angio Abdomen and Pelvis w/ IV Cont (21 @ 23:50) >  EXAM:  CT ANGIO ABD PELV (W)AW IC                            *** ADDENDUM 2021  ***    Addendum:    IMPRESSION:  Incompletely visualized nodular breast parenchyma versus breast nodule in the left breast.  Correlate with physical exam findingsand mammography.  I discussed the findings with Dr. Marsh on 2021 at 2:40 AM.  Read back verification was obtained    *** END OF ADDENDUM 2021  ***      PROCEDURE DATE:  2021            INTERPRETATION:  CLINICAL INFORMATION: Abdominal pain. Evaluation for mesenteric ischemia.    COMPARISON: Chest radiograph from the same day.    CONTRAST/COMPLICATIONS:  IV Contrast: Omnipaque 350. 90 mL administered. 10 mL discarded.  Oral Contrast: None.  Complications: No immediate complications.    PROCEDURE:  CT Angiography of the Abdomen and Pelvis was performed.  Arterial and venous phases were acquired.  Sagittal and coronal reformats were performed as well as 3D (MIP) reconstructions.    FINDINGS:  LOWER CHEST: Incompletely visualized in the inferior left breast is nodular breast parenchyma versus a breast nodule measuring at least 2 x 1.3 cm (2:1).    LIVER: There is a vague 1.5 cm nodular hyperenhancing focus in the posterior right hepatic lobe on arterial phase images (2:24 and 602:55) that is not visualized on venous phase images.  BILE DUCTS: Mildly dilated common bile duct compatible postcholecystectomy state.  GALLBLADDER: Status post cholecystectomy.  SPLEEN: Within normal limits.  PANCREAS: Within normal limits.  ADRENALS: Within normal limits.  KIDNEYS/URETERS: Approximately 1 cm cysts are seen in the upper pole both kidneys.  Multiple subcentimeter hypoattenuating foci in both kidneys are too small accurately characterize by CT scan but probably represent cysts.    BLADDER: Within normal limits.  REPRODUCTIVE ORGANS: Status post hysterectomy.  The right and left ovaries are visualized and appear unremarkable.    BOWEL: Small hiatal hernia.  No bowel obstruction, bowel wall thickening or mesenteric edema.  Normal appendix.  Mild sigmoid diverticulosis diverticulosis.  No pneumatosis.  No obvious bowel wall perfusion abnormality on arterial or venous phase images.  PERITONEUM: No ascites, free air or abscess.    VESSELS: There are scattered atherosclerotic calcifications of the aortoiliac tree.  The celiac access, superior mesenteric artery, bilateral main renal arteries and inferior mesenteric artery are grossly patent.  There is a moderate stenosis in the proximal celiac trunk and a mild stenosis at the SMA origin.  The splenic vein, SMV and main portal vein are grossly patent.  On venous phase images, there are two rounded filling defects in the right ovarian vein (2:182 and 2:187), compatible with nonocclusive thrombus.  The right ovarian vein appears focally distended at these levels, suggesting that the thrombus is acute.  RETROPERITONEUM/LYMPH NODES: No retroperitoneal hemorrhage or inflammatory change.  No lymphadenopathy.    ABDOMINAL WALL: Tiny fat-containing umbilical hernia.  BONES: Mild multilevel degenerative changes in the spine.  No evidence of fracture or suspicious osseous lesion.    IMPRESSION:  1.  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  There is no downstream pelvic congestion.    2.  No CT evidence of mesenteric ischemia.      3.  There is atherosclerotic disease of the aortoiliac tree.  There is a moderate stenosis in the proximal celiac trunk and a mild stenosis at the SMA origin, without vascular occlusion.

## 2021-04-17 NOTE — ED CDU PROVIDER INITIAL DAY NOTE - DETAILS
Preparation and Hygiene:  1. Shower daily.  2. Wear a clean bra each day and wash daily in warm soapy water.  3. Change wet or moist breast pads frequently.  Moist pads can promote growth of germs.  Actively wash your hands, paying close attention to the area around and under your fingernails, thoroughly with soap and water for 15 seconds before pumping or handling your milk.  Re-wash your hands if you touch anything (scratching your nose, answering the phone, etc) while pumping or handling your milk.   4. Before pumping your breasts, assemble the pump collection kit and have ready the sterile container and labels.  Place these items on a clean surface next to the breast pump.  5. Each time after you have finished pumping, take apart all of the parts of the breast pump collection kit and place them in a separate cleaning container (do not place them in the sink).  Be sure to remove the yellow valve from the breast shield and separate the white membrane from the yellow valve.  Rinse all of these parts with cool water.  Then use a new sponge and/or bottle brush and dishwashing detergent to clean the parts.  Rinse off the soapy water with cool water and air dry on a clean towel covered with a clean cloth.  All parts may also be washed after each use in the top rack of a .  6. Once each day, sanitize all of the parts of the breast pump collection kit.  This can be done by boiling the kit parts for 10 minutes or by using a Quick Clean Micro-Steam Bag made by [employer]  7. If condensation appears in the tubing, continue to run the pump with the tubing attached for 1-2 minutes or until the tubing is dry.   8. Notify your babys nurse or doctor if you become ill or need to take any medication, even over-the-counter medicines.  Collection and Storage of Expressed Breast milk:         1. Pump your breasts at least 8 or more times every 24 hours.  Double pump (both breasts at the same time) for at least 15-20  minutes using the most suction that is comfortable.    2. Write the date and time of pumping and the name of any medications you are taking on the babys pre-printed hospital identification label.   3.    Do not touch the inside of the storage containers or lids.  4.        Tightly screw the lid onto the container and place immediately into the refrigerator for daily use.  5.    Expressed breast milk should be refrigerated or frozen within 4 hours of pumping.  6.        Do not store expressed breast milk on the door of your refrigerator or freezer             where the temperature is warmer.   7.        Refrigerated milk may be stored for up to 7 days.  At this point it can be moved to the freezer for 6 -12 months.  8.        Thaw frozen breast milk in overnight in the refrigerator.  Once milk is thawed it must be used within 24 hours.  9.        Refrigerated breast milk needs to be warmed to room temperature.  Warm by leaving unrefrigerated until it reached room temperature, or place sealed bottle into a cup of warm (not boiling) water or use a bottle warmer.               Never warm breast milk in a microwave or boiling water.    For any questions or concerns call the Lactation Department at [phone]   76y female p/w abdominal pain, right ovarian vein thrombus:  -serial abd exams, pain control, hematology recs, OBGYN recs     d/w ED team 76y female p/w abdominal pain, right ovarian vein thrombus:  -serial abd exams, pain control, TVUS, hematology recs, OBGYN recs     d/w ED team

## 2021-04-17 NOTE — ED CDU PROVIDER INITIAL DAY NOTE - OBJECTIVE STATEMENT
75 yo F with PMH HTN presents with epigastric abdominal pain, intermittent, worse with eating, over the last month, worse over the last 2 days. Endorses 6 lb weight loss over the last few months. No melena, no hematochezia. Endorses nausea but no vomiting. endorses hx of GERD but states this feels different. Also endorses "bitter" taste in mouth over the last few days. Significant abdominal surgeries include hysterectomy many years ago and cholecystectomy  In ED pt as found to have an acute appearing multifocal nonocclusive thrombus in the right ovarian vein as well as a left breast mass (nodule vs cyst). labs unrevealing. OBGYN was consulted, pt went to CDU for further eval including hematology consult for recommendation on anticoagulation + hypercoagulable workup

## 2021-04-17 NOTE — CONSULT NOTE ADULT - ASSESSMENT
-incomplete  This is a 77 y/o F, w/ hx of hysterectomy and cholecystectomy, who p/w few days of abd pain. She was found to have Rt ovarian thrombosis, L breast nodule, and arterial phase enhancing small liver lesion in CT scan. Hematology was consulted for Rt ovarian thrombosis    #Rt ovarian thrombosis, acute  - given her past hx total hysterectomy + salpingooophorectomy (?), it is unclear how the ovarian vein thrombosis can be significant in this setting  - however, agree with age-appropriate cancer work up.  - eliquis for AC should be ok, however, her AC is possibly non-urgent and it can deter her from rapid diagnostic workup (ie. biopsy). will discuss the optimal sequence of mgmt with patient    #Possible cancer work up  - breast nodule should be explored. recommend to have diagnostic breast US  - liver lesion is 1.5cm but unclear morphology. Outpatient chart denotes that her mother had liver cancer.: please check hepatitis panel. should have MRI abd with liver protocol (will discuss outpat vs inpatient)  - report insignificant colonoscopy last year  - f/u transvaginal US      Fawn Munroe MD, PGY-4  Translational Medical Oncology Fellow  Pager: [phone] This is a 77 y/o F, w/ hx of hysterectomy and cholecystectomy, who p/w few days of abd pain. She was found to have Rt ovarian thrombosis, L breast nodule, and arterial phase enhancing small liver lesion in CT scan. Hematology was consulted for Rt ovarian thrombosis    #Rt ovarian thrombosis, acute  - given her past hx total hysterectomy + questionable salpingooophorectomy (patient states that she had Rt ovarian resected only. CT scan report intact b/l ovaries)  - however, agree with age-appropriate cancer work up.  - eliquis for AC for a short course recommended. will give her follow up appt at [facility]    #Possible cancer work up  - Age-appropriate cancer work up should be conducted as an outpatient with her PCP  - breast nodule should be explored. recommend to have diagnostic breast US/mammogram as an outpatient  - liver lesion is 1.5cm but unclear morphology. Outpatient chart denotes that her mother had liver cancer.: please check hepatitis panel. should have MRI abd with liver protocol as an outpatient  - report insignificant colonoscopy last year  - f/u transvaginal US    - patient does not need to stay in hospital to finish the workup. all can be done as an outpatient.    Fawn Munroe MD, PGY-4  Translational Medical Oncology Fellow  Pager: [phone]

## 2021-04-17 NOTE — ED CDU PROVIDER DISPOSITION NOTE - ATTENDING CONTRIBUTION TO CARE
I have personally performed a face to face medical and diagnostic evaluation of the patient. I have discussed with and reviewed the ACP's note and agree with the History, ROS, Physical Exam and MDM unless otherwise indicated. A brief summary of my personal evaluation and impression can be found below.    75 yo F with PMH HTN presents with epigastric abdominal pain, intermittent, worse with eating, over the last month, worse over the last 2 days. Endorses 6 lb weight loss over the last few months. No melena, no hematochezia. Endorses nausea but no vomiting. endorses hx of GERD but states this feels different. Also endorses "bitter" taste in mouth over the last few days. Significant abdominal surgeries include hysterectomy many years ago and cholecystectomy  In ED pt as found to have an acute appearing multifocal nonocclusive thrombus in the right ovarian vein as well as a left breast mass (nodule vs cyst). labs unrevealing.     Pt was sent to CDU for serial exams, pending OB and Heme (c/f hypercoagulable state) consult, per OB requesting TVUS and pending heme eval this morning.     Per pt overnight, no acute complaints. Comfortable appearing, tolerating PO this morning w/o issue.     VITALS: Initial triage and subsequent vitals have been reviewed by me.  GEN APPEARANCE: WDWN, alert, non-toxic, NAD  HEAD: Atraumatic.  EYES: PERRLa, EOMI, vision grossly intact.   NECK: Supple  CV: RRR, S1S2, no c/r/m/g. Cap refill <2 seconds. No bruits.   LUNGS: CTAB. No abnormal breath sounds.  ABDOMEN: Soft, NTND. No guarding or rebound.   MSK/EXT: No spinal or extremity point tenderness. No CVA ttp. Pelvis stable. No peripheral edema.  NEURO: Alert, follows commands. Weight bearing normal. Speech normal. Sensation and motor normal x4 extremities.   SKIN: Warm, dry and intact. No rash.  PSYCH: Appropriate    Plan/MDM: 75 yo F with PMH HTN presents with epigastric abdominal pain, intermittent, worse with eating, over the last month, worse over the last 2 days found to have ovarian reynaldo thrombus on CT otherwise non actionable, seen by OB, requesting TVUS thereafter likely fit for outpt f/u at breast clinic, pending heme consult and eval for hypercoag state this a.m, will discuss with heme, serially reassess, dispo thereafter.

## 2021-04-17 NOTE — ED CDU PROVIDER INITIAL DAY NOTE - PROGRESS NOTE DETAILS
CDU PROGRESS NOTE ARRON TELLEZ: on arrival to CDU pt had significant anxiety 2/2 concerns about the possibility of having cancer. pt instructed to take her home dose of Klonopin, counseled on plan, feeling slightly better. VSS. multiple calls placed to hem fellow without answer. OBGYN re-paged to eval for breast mass. Will continue to monitor Spoke to Heme/Onc for consult and daughter to inform about the patient status. Requesting breast ultrasound and transvaginal US. pt resting. very anxious, pacing in the unit. pt seen and evaluated by Hem/Onc- pt to start Eliquis 5mg BID for 3 months with close follow up at [facility], Outpt Mammo and Breast US along with Abd MRI for liver lesions. Transvaginal US- unable to visualize the ovaries. Plan of care discussed with pt and Dr. Rogers and stable for discharge.

## 2021-04-17 NOTE — CONSULT NOTE ADULT - ASSESSMENT
77yo P3 LMP>30years p/w upper abdominal pain for 3 years with CTA showing acute non-occlusive right vein thrombosis.  - Rec Med/Heme eval for anticoagulation  - Rec medicine evaluation for possible underlying malignancy as this can be a sign of a thrombotic state.  - No other acute GYN intervention at this time.    dw Dr.David Jason Winchester PGY2

## 2021-04-17 NOTE — ED ADULT NURSE REASSESSMENT NOTE - NS ED NURSE REASSESS COMMENT FT1
Received pt from OLIVER William, received pt alert and responsive, oriented x4, denies any respiratory distress, SOB, or difficulty breathing. Pt transferred to CDU for abdominal pain. Pt not c/o pain at this time. +Anxiety.  Pending hematology and GYN recommendations.  IV in place, patent and free of signs of infiltration,  pt denies chest pain or palpitations, V/S stable, pt afebrile, pt denies pain at this time. Pt educated on unit and unit rules, instructed patient to notify RN of any needed assistance, Pt verbalizes understanding, Call bell placed within reach. Safety maintained. Will continue to monitor. Snack and beverage provided.

## 2021-04-17 NOTE — ED CDU PROVIDER INITIAL DAY NOTE - ATTENDING CONTRIBUTION TO CARE
Right ovarian vein thrombus on CT a/p. Gynecology recommends heme c/s for hypercoagulable work-up and anticoagulation. CDU for AM heme consult and possible initiation of AC. I supervised the care of this patient from Midnight until 7AM on 4/17/21. MANSOOR
1

## 2021-04-17 NOTE — CONSULT NOTE ADULT - SUBJECTIVE AND OBJECTIVE BOX
YOGI LEVINE  MRN-[id number]      Reason for Consult: ovarian vein thrombosis    HPI: This is a 75 y/o F, w/ hx of hysterectomy and cholecystectomy, who p/w few days of abd pain.     In the ED, her CT A/P revealed Rt ovarian thrombosis, L breast nodule, and arterial phase enhancing small liver lesion. Also with mild sigmiod diverticulosis      Outpatient chart denotes that her mother had liver cancer.  Also has total hysterectomy + salpingooophorectomy (?)      PAST MEDICAL & SURGICAL HISTORY:  HTN (hypertension)    H/O: hysterectomy    History of cholecystectomy        FAMILY HISTORY:  No pertinent family history in first degree relatives      Social History:      Home Medications:    Allergies    Tylenol with Codeine (Unknown)    Intolerances        MEDICATIONS  (STANDING):    MEDICATIONS  (PRN):          Objectives:  Vital Signs Last 24 Hrs  T(C): 36.6 (2021 07:15), Max: 36.9 (2021 03:00)  T(F): 97.9 (2021 07:15), Max: 98.4 (2021 03:00)  HR: 80 (2021 07:15) (71 - 80)  BP: 128/82 (2021 07:15) (128/82 - 181/87)  BP(mean): 114 (2021 03:00) (114 - 114)  RR: 16 (2021 07:15) (16 - 20)  SpO2: 98% (2021 07:15) (97% - 99%)    Physical exam  General - NAD, alert and oriented  HEENT - PERRLA, EOM intact, sclera and conjunctiva clear, oropharynx, nares clear  Neck - Supple, no thyromegaly or thyroid nodules, no bruits  Cardiovascular - RRR no m/r/g, no JVD, no carotid bruits  Lungs - CTAB, no use of acessory muscles, no w/c/r  Abdomen - Normal bowel sounds, NT/ND  Genito Urinary –   Lymph Nodes - No LAD  Extremeties - No e/c/c  Skin - No rashes, skin warm and dry, no erythematous areas  Musculo Skeletal - 5/5 strength, normal range of motion, no swollen or erythematous joints.  Neurological – Alert and oriented x 3, CN 2-12 grossly intact.          Labs:    CBC Full  -  ( 2021 20:43 )  WBC Count : 8.44 K/uL  RBC Count : 4.69 M/uL  Hemoglobin : 14.3 g/dL  Hematocrit : 42.5 %  Platelet Count - Automated : 306 K/uL  Mean Cell Volume : 90.6 fl  Mean Cell Hemoglobin : 30.5 pg  Mean Cell Hemoglobin Concentration : 33.6 gm/dL  Auto Neutrophil # : 6.18 K/uL  Auto Lymphocyte # : 1.67 K/uL  Auto Monocyte # : 0.47 K/uL  Auto Eosinophil # : 0.04 K/uL  Auto Basophil # : 0.05 K/uL  Auto Neutrophil % : 73.1 %  Auto Lymphocyte % : 19.8 %  Auto Monocyte % : 5.6 %  Auto Eosinophil % : 0.5 %  Auto Basophil % : 0.6 %    PT/INR - ( 2021 22:23 )   PT: 13.1 sec;   INR: 1.10 ratio         PTT - ( 2021 22:23 )  PTT:29.9 sec        141  |  105  |  16  ----------------------------<  147<H>  3.8   |  24  |  0.69    Ca    9.4      2021 20:43    TPro  7.7  /  Alb  4.3  /  TBili  0.2  /  DBili  x   /  AST  21  /  ALT  30  /  AlkPhos  84      LIVER FUNCTIONS - ( 2021 20:43 )  Alb: 4.3 g/dL / Pro: 7.7 g/dL / ALK PHOS: 84 U/L / ALT: 30 U/L / AST: 21 U/L / GGT: x             Urinalysis Basic - ( 2021 23:53 )    Color: Colorless / Appearance: Clear / S.013 / pH: x  Gluc: x / Ketone: Negative  / Bili: Negative / Urobili: Negative   Blood: x / Protein: Negative / Nitrite: Negative   Leuk Esterase: Small / RBC: 19 /hpf / WBC 1 /HPF   Sq Epi: x / Non Sq Epi: 0 /hpf / Bacteria: Negative            Imagings:    < from: CT Angio Abdomen and Pelvis w/ IV Cont (21 @ 23:50) >    EXAM:  CT ANGIO ABD PELV (W)AW IC                            *** ADDENDUM 2021  ***    Addendum:    IMPRESSION:  Incompletely visualized nodular breast parenchyma versus breast nodule in the left breast.  Correlate with physical exam findingsand mammography.  I discussed the findings with Dr. Marsh on 2021 at 2:40 AM.  Read back verification was obtained    *** END OF ADDENDUM 2021  ***      PROCEDURE DATE:  2021            INTERPRETATION:  CLINICAL INFORMATION: Abdominal pain. Evaluation for mesenteric ischemia.    COMPARISON: Chest radiograph from the same day.    CONTRAST/COMPLICATIONS:  IV Contrast: Omnipaque 350. 90 mL administered. 10 mL discarded.  Oral Contrast: None.  Complications: No immediate complications.    PROCEDURE:  CT Angiography of the Abdomen and Pelvis was performed.  Arterial and venous phases were acquired.  Sagittal and coronal reformats were performed as well as 3D (MIP) reconstructions.    FINDINGS:  LOWER CHEST: Incompletely visualized in the inferior left breast is nodular breast parenchyma versus a breast nodule measuring at least 2 x 1.3 cm (2:1).    LIVER: There is a vague 1.5 cm nodular hyperenhancing focus in the posterior right hepatic lobe on arterial phase images (2:24 and 602:55) that is not visualized on venous phase images.  BILE DUCTS: Mildly dilated common bile duct compatible postcholecystectomy state.  GALLBLADDER: Status post cholecystectomy.  SPLEEN: Within normal limits.  PANCREAS: Within normal limits.  ADRENALS: Within normal limits.  KIDNEYS/URETERS: Approximately 1 cm cysts are seen in the upper pole both kidneys.  Multiple subcentimeter hypoattenuating foci in both kidneys are too small accurately characterize by CT scan but probably represent cysts.    BLADDER: Within normal limits.  REPRODUCTIVE ORGANS: Status post hysterectomy.  The right and left ovaries are visualized and appear unremarkable.    BOWEL: Small hiatal hernia.  No bowel obstruction, bowel wall thickening or mesenteric edema.  Normal appendix.  Mild sigmoid diverticulosis diverticulosis.  No pneumatosis.  No obvious bowel wall perfusion abnormality on arterial or venous phase images.  PERITONEUM: No ascites, free air or abscess.    VESSELS: There are scattered atherosclerotic calcifications of the aortoiliac tree.  The celiac access, superior mesenteric artery, bilateral main renal arteries and inferior mesenteric artery are grossly patent.  There is a moderate stenosis in the proximal celiac trunk and a mild stenosis at the SMA origin.  The splenic vein, SMV and main portal vein are grossly patent.  On venous phase images, there are two rounded filling defects in the right ovarian vein (2:182 and 2:187), compatible with nonocclusive thrombus.  The right ovarian vein appears focally distended at these levels, suggesting that the thrombus is acute.  RETROPERITONEUM/LYMPH NODES: No retroperitoneal hemorrhage or inflammatory change.  No lymphadenopathy.    ABDOMINAL WALL: Tiny fat-containing umbilical hernia.  BONES: Mild multilevel degenerative changes in the spine.  No evidence of fracture or suspicious osseous lesion.    IMPRESSION:  1.  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  There is no downstream pelvic congestion.    2.  No CT evidence of mesenteric ischemia.      3.  There is atherosclerotic disease of the aortoiliac tree.  There is a moderate stenosis in the proximal celiac trunk and a mild stenosis at the SMA origin, without vascular occlusion.        < end of copied text >     YOGI LEVINE  MRN-[id number]      Reason for Consult: ovarian vein thrombosis    HPI: This is a 75 y/o F, w/ hx of hysterectomy and cholecystectomy, who p/w few days of abd pain.     In the ED, her CT A/P revealed Rt ovarian thrombosis, L breast nodule, and arterial phase enhancing small liver lesion. Also with mild sigmiod diverticulosis    Outpatient chart denotes that her mother had liver cancer.  Also has total hysterectomy + salpingooophorectomy (?)      PAST MEDICAL & SURGICAL HISTORY:  HTN (hypertension)    H/O: hysterectomy    History of cholecystectomy        FAMILY HISTORY:  No pertinent family history in first degree relatives      Social History:      Home Medications:    Allergies    Tylenol with Codeine (Unknown)    Intolerances        MEDICATIONS  (STANDING):    MEDICATIONS  (PRN):          Objectives:  Vital Signs Last 24 Hrs  T(C): 36.6 (2021 07:15), Max: 36.9 (2021 03:00)  T(F): 97.9 (2021 07:15), Max: 98.4 (2021 03:00)  HR: 80 (2021 07:15) (71 - 80)  BP: 128/82 (2021 07:15) (128/82 - 181/87)  BP(mean): 114 (2021 03:00) (114 - 114)  RR: 16 (2021 07:15) (16 - 20)  SpO2: 98% (2021 07:15) (97% - 99%)    Physical exam  General - NAD, alert and oriented  HEENT - PERRLA, EOM intact, sclera and conjunctiva clear  Neck - Supple, no thyromegaly or thyroid nodules  Cardiovascular - RRR no m/r/g  Lungs - CTAB, no use of acessory muscles, no w/c/r  Abdomen - Normal bowel sounds, NT/ND  Lymph Nodes - No LAD  Extremeties - No e/c/c  Skin - No rashes, skin warm and dry, no erythematous areas  Neurological – Alert and oriented x 3          Labs:    CBC Full  -  ( 2021 20:43 )  WBC Count : 8.44 K/uL  RBC Count : 4.69 M/uL  Hemoglobin : 14.3 g/dL  Hematocrit : 42.5 %  Platelet Count - Automated : 306 K/uL  Mean Cell Volume : 90.6 fl  Mean Cell Hemoglobin : 30.5 pg  Mean Cell Hemoglobin Concentration : 33.6 gm/dL  Auto Neutrophil # : 6.18 K/uL  Auto Lymphocyte # : 1.67 K/uL  Auto Monocyte # : 0.47 K/uL  Auto Eosinophil # : 0.04 K/uL  Auto Basophil # : 0.05 K/uL  Auto Neutrophil % : 73.1 %  Auto Lymphocyte % : 19.8 %  Auto Monocyte % : 5.6 %  Auto Eosinophil % : 0.5 %  Auto Basophil % : 0.6 %    PT/INR - ( 2021 22:23 )   PT: 13.1 sec;   INR: 1.10 ratio         PTT - ( 2021 22:23 )  PTT:29.9 sec        141  |  105  |  16  ----------------------------<  147<H>  3.8   |  24  |  0.69    Ca    9.4      2021 20:43    TPro  7.7  /  Alb  4.3  /  TBili  0.2  /  DBili  x   /  AST  21  /  ALT  30  /  AlkPhos  84      LIVER FUNCTIONS - ( 2021 20:43 )  Alb: 4.3 g/dL / Pro: 7.7 g/dL / ALK PHOS: 84 U/L / ALT: 30 U/L / AST: 21 U/L / GGT: x             Urinalysis Basic - ( 2021 23:53 )    Color: Colorless / Appearance: Clear / S.013 / pH: x  Gluc: x / Ketone: Negative  / Bili: Negative / Urobili: Negative   Blood: x / Protein: Negative / Nitrite: Negative   Leuk Esterase: Small / RBC: 19 /hpf / WBC 1 /HPF   Sq Epi: x / Non Sq Epi: 0 /hpf / Bacteria: Negative            Imagings:    < from: CT Angio Abdomen and Pelvis w/ IV Cont (21 @ 23:50) >    EXAM:  CT ANGIO ABD PELV (W)AW IC                            *** ADDENDUM 2021  ***    Addendum:    IMPRESSION:  Incompletely visualized nodular breast parenchyma versus breast nodule in the left breast.  Correlate with physical exam findingsand mammography.  I discussed the findings with Dr. Marsh on 2021 at 2:40 AM.  Read back verification was obtained    *** END OF ADDENDUM 2021  ***      PROCEDURE DATE:  2021            INTERPRETATION:  CLINICAL INFORMATION: Abdominal pain. Evaluation for mesenteric ischemia.    COMPARISON: Chest radiograph from the same day.    CONTRAST/COMPLICATIONS:  IV Contrast: Omnipaque 350. 90 mL administered. 10 mL discarded.  Oral Contrast: None.  Complications: No immediate complications.    PROCEDURE:  CT Angiography of the Abdomen and Pelvis was performed.  Arterial and venous phases were acquired.  Sagittal and coronal reformats were performed as well as 3D (MIP) reconstructions.    FINDINGS:  LOWER CHEST: Incompletely visualized in the inferior left breast is nodular breast parenchyma versus a breast nodule measuring at least 2 x 1.3 cm (2:1).    LIVER: There is a vague 1.5 cm nodular hyperenhancing focus in the posterior right hepatic lobe on arterial phase images (2:24 and 602:55) that is not visualized on venous phase images.  BILE DUCTS: Mildly dilated common bile duct compatible postcholecystectomy state.  GALLBLADDER: Status post cholecystectomy.  SPLEEN: Within normal limits.  PANCREAS: Within normal limits.  ADRENALS: Within normal limits.  KIDNEYS/URETERS: Approximately 1 cm cysts are seen in the upper pole both kidneys.  Multiple subcentimeter hypoattenuating foci in both kidneys are too small accurately characterize by CT scan but probably represent cysts.    BLADDER: Within normal limits.  REPRODUCTIVE ORGANS: Status post hysterectomy.  The right and left ovaries are visualized and appear unremarkable.    BOWEL: Small hiatal hernia.  No bowel obstruction, bowel wall thickening or mesenteric edema.  Normal appendix.  Mild sigmoid diverticulosis diverticulosis.  No pneumatosis.  No obvious bowel wall perfusion abnormality on arterial or venous phase images.  PERITONEUM: No ascites, free air or abscess.    VESSELS: There are scattered atherosclerotic calcifications of the aortoiliac tree.  The celiac access, superior mesenteric artery, bilateral main renal arteries and inferior mesenteric artery are grossly patent.  There is a moderate stenosis in the proximal celiac trunk and a mild stenosis at the SMA origin.  The splenic vein, SMV and main portal vein are grossly patent.  On venous phase images, there are two rounded filling defects in the right ovarian vein (2:182 and 2:187), compatible with nonocclusive thrombus.  The right ovarian vein appears focally distended at these levels, suggesting that the thrombus is acute.  RETROPERITONEUM/LYMPH NODES: No retroperitoneal hemorrhage or inflammatory change.  No lymphadenopathy.    ABDOMINAL WALL: Tiny fat-containing umbilical hernia.  BONES: Mild multilevel degenerative changes in the spine.  No evidence of fracture or suspicious osseous lesion.    IMPRESSION:  1.  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  There is no downstream pelvic congestion.    2.  No CT evidence of mesenteric ischemia.      3.  There is atherosclerotic disease of the aortoiliac tree.  There is a moderate stenosis in the proximal celiac trunk and a mild stenosis at the SMA origin, without vascular occlusion.        < end of copied text >

## 2021-04-17 NOTE — ED CDU PROVIDER INITIAL DAY NOTE - PHYSICAL EXAMINATION
PHYSICAL EXAM:   General: well-appearing, appears stated age, not in extremis   HEENT: NC/AT, airway patent  Cardiovascular: regular rate   Respiratory:  nonlabored respirations  Abdominal: soft, nontender, nondistended, no rebound, guarding or rigidity  Neuro: awake, alert, interactive  Psychiatric: appropriate mood and affect.

## 2021-04-19 PROBLEM — I10 ESSENTIAL (PRIMARY) HYPERTENSION: Chronic | Status: ACTIVE | Noted: 2021-04-16

## 2021-04-21 ENCOUNTER — APPOINTMENT (OUTPATIENT)
Dept: GASTROENTEROLOGY | Facility: CLINIC | Age: 76
End: 2021-04-21
Payer: MEDICARE

## 2021-04-21 ENCOUNTER — RX RENEWAL (OUTPATIENT)
Age: 76
End: 2021-04-21

## 2021-04-21 VITALS
OXYGEN SATURATION: 96 % | HEIGHT: 62 IN | SYSTOLIC BLOOD PRESSURE: 135 MMHG | BODY MASS INDEX: 24.48 KG/M2 | TEMPERATURE: 97.1 F | HEART RATE: 80 BPM | DIASTOLIC BLOOD PRESSURE: 86 MMHG | WEIGHT: 133 LBS

## 2021-04-21 DIAGNOSIS — K44.9 DIAPHRAGMATIC HERNIA W/OUT OBSTRUCTION OR GANGRENE: ICD-10-CM

## 2021-04-21 DIAGNOSIS — R10.13 EPIGASTRIC PAIN: ICD-10-CM

## 2021-04-21 DIAGNOSIS — K30 FUNCTIONAL DYSPEPSIA: ICD-10-CM

## 2021-04-21 DIAGNOSIS — K21.9 GASTRO-ESOPHAGEAL REFLUX DISEASE W/OUT ESOPHAGITIS: ICD-10-CM

## 2021-04-21 PROCEDURE — 99214 OFFICE O/P EST MOD 30 MIN: CPT

## 2021-04-21 PROCEDURE — 99072 ADDL SUPL MATRL&STAF TM PHE: CPT

## 2021-04-21 RX ORDER — DICYCLOMINE HYDROCHLORIDE 10 MG/1
10 CAPSULE ORAL 3 TIMES DAILY
Qty: 90 | Refills: 3 | Status: ACTIVE | COMMUNITY
Start: 2021-04-21 | End: 1900-01-01

## 2021-04-21 RX ORDER — FAMOTIDINE/CA CARB/MAG HYDROX 10-800-165
10-800-165 TABLET,CHEWABLE ORAL
Refills: 0 | Status: ACTIVE | COMMUNITY

## 2021-04-21 RX ORDER — LANSOPRAZOLE 30 MG/1
30 CAPSULE, DELAYED RELEASE ORAL DAILY
Qty: 90 | Refills: 3 | Status: ACTIVE | COMMUNITY
Start: 2021-04-21 | End: 1900-01-01

## 2021-04-21 NOTE — ASSESSMENT
[FreeTextEntry1] : Abdominal Pain: The patient complains of abdominal pain. The patient is to avoid nonsteroidal anti-inflammatory drugs and aspirin. I recommend a trial of Lansoprazole 30 mg once a day for 3 months for the symptoms.  \par Dyspepsia: The patient complains of dyspeptic symptoms.  The patient was advised to abide by an anti-gas diet.  The patient was given a pamphlet for anti-gas.  The patient and I reviewed the anti-gas diet at length. The patient is to start on a trial of Phazyme one tablet 3 times a day p.r.n. abdominal pain and gas.\par GERD: The patient was advised to avoid late-night meals and dietary indiscretions.  The patient was advised to avoid fried and fatty foods.  The patient was advised to abide by an anti-GERD diet. The patient was given a pamphlet for anti-GERD.  The patient and I reviewed the anti-GERD diet at length. I recommend a trial of Lansoprazole 30 mg once a day x 3 months for the symptoms.\par Hiatal Hernia: The patient was advised to avoid late-night meals and dietary indiscretions. The patient was advised to avoid fried and fatty foods. The patient was advised to abide by an anti-GERD diet. The patient was given a pamphlet for anti-GERD. The patient and I reviewed the anti-GERD diet at length. I recommend a trial of Lansoprazole 30 mg once a day x 3 months for the symptoms.\par Gastritis: The patient has a history of gastritis. The patient is to avoid nonsteroidal anti-inflammatory drugs and aspirin. I recommend a trial of Lansoprazole 30 mg once a day x 3 months for the symptoms.\par Internal Hemorrhoids: The patient is to be started on a trial of Anusol H. C. suppositories one per rectum nightly and Anusol HC2.5% cream apply to affected area twice a day p.r.n. hemorrhoidal bleeding or pain. \par Fatty Liver: The patient had an imaging study (MRI) suggestive of fatty infiltration of the liver. The patient denies any jaundice or pruritus. The patient denies any alcohol use. The patient denies taking large doses of nonsteroidal anti-inflammatory drugs or acetaminophen. The findings are suggestive of fatty liver. The patient and I had a long discussion regarding the risks of fatty liver and possible progression to cirrhosis. The patient was told of the possible increased risk of developing liver failure, cirrhosis, ascites, GI bleeding secondary to varices, hepatic encephalopathy, bleeding tendencies and liver cancer. The patient was told of the importance of follow-up. The patient was advised to follow up every 6 months for blood work and imaging studies. The patient agreed and will follow up. The patient was advised to lose weight. I recommend a trial of vitamin E supplementation for the fatty liver. If the liver enzymes remain elevated, the patient may require a trial of Pioglitazone for the fatty liver. I recommend avoid alcohol and hepato-toxic agents. The patient was also advised to avoid NSAIDs, Acetaminophen and any other hepatotoxic drugs. The patient was also advised not to share needles, razors, scissors, nail clippers, etc.. The patient is to continue close follow-up in our office for blood work and exams. If the liver enzymes remain elevated, the patient may require a CT guided liver biopsy to assess the liver parenchyma for possible treatment. We had a long discussion regarding the risks and benefits of the procedure. The patient was told of the risks of bleeding, perforation, infections, emergency surgery and missing lesions. The patient agreed and will follow-up to reassess the symptoms. I recommend a CBC, SMA 24, amylase, lipase, ESR, TFTs, ANATOLY, rheumatoid factor, celiac sprue panel, IgA, profile for hepatitis A, B, C. ,AFP, alpha 1 anti-trypsin antibody, ceruloplasmin level, iron, TIBC, ferritin level, AMA, anti smooth muscle antibody and PT/INR/PTT in 6 months. I recommend an abdominal ultrasound of the liver to assess the liver parenchyma and for liver lesions in 6 months.\par Irritable Bowel Syndrome: The patient is to be started on a trial of Phazyme 1 tablet 3 times a day p.r.n. abdominal pain. The patient was also advised to follow a low FOD-MAP diet for the irritable bowel syndrome. if the symptoms persist, the patient may require a trial of Xifaxin for possible post infectious irritable bowel syndrome. \par Follow-up: The patient is to follow-up in the office in 4 weeks to reassess the symptoms. The patient was told to call the office if any further problems. \par \par \par \par \par \par

## 2021-04-21 NOTE — HISTORY OF PRESENT ILLNESS
[None] : had no significant interval events [Vomiting] : denies vomiting [Diarrhea] : denies diarrhea [Constipation] : denies constipation [Yellow Skin Or Eyes (Jaundice)] : denies jaundice [Rectal Pain] : denies rectal pain [Wt Loss ___ Lbs] : recent [unfilled] ~Upound(s) weight loss [Heartburn] : heartburn [Nausea] : nausea [Abdominal Pain] : abdominal pain [Abdominal Swelling] : abdominal swelling [GERD] : gastroesophageal reflux disease [Cholelithiasis] : cholelithiasis [Kidney Stone] : kidney stone [Wt Gain ___ Lbs] : no recent weight gain [Peptic Ulcer Disease] : no peptic ulcer disease [Pancreatitis] : no pancreatitis [Inflammatory Bowel Disease] : no inflammatory bowel disease [Irritable Bowel Syndrome] : no irritable bowel syndrome [Diverticulitis] : no diverticulitis [Alcohol Abuse] : no alcohol abuse [Malignancy] : no malignancy [Abdominal Surgery] : no abdominal surgery [Appendectomy] : no appendectomy [Cholecystectomy] : no cholecystectomy [de-identified] : The patient denies any prior exposure to hepatitis A, B or C. The patient denies any large doses of nonsteroidal anti-inflammatory drugs or acetaminophen. The patient denies sharing needles, razors, nail clippers, nail files, scissors, et cetera. The patient denies any EtOH abuse, cocaine use or intravenous drug use. The patient denies any prior blood transfusions, sexual indiscretions, tattoos or piercing. The patient admits to having prior surgery. The history of surgery is significant for a prior cholecystectomy and hysterectomy. The patient admits to a family history of GI problems. The patient’s paternal aunt had a history of gastric and reflux issue. The patient states that she is feeling uncomfortable x 3 months. The patient denies any jaundice or pruritus.  The patient denies any complains of chronic lower back pain. The patient complains of abdominal pain.  The patient describes the abdominal pain as a crampy, intermittent midepigastric discomfort that is nonradiating in nature.  The abdominal pain is unrelated to passing gas or having bowel movements.  The abdominal pain is worse with meals.  The abdominal pain is described as being mild to moderate in nature.  The abdominal pain occurs at night and in the morning.  The abdominal pain can occur at any time.   The abdominal pain has never awakened the patient from sleep.  The abdominal pain is not relieved with medication.  The abdominal pain is associated with abdominal gas and bloating.  The patient complains of nausea but denies any vomiting.  The patient complains of gastroesophageal reflux disease but denies any dysphagia.  The gastroesophageal reflux disease is worse after meals and late at night and in the early morning. The gastroesophageal reflux disease is minimally improved with proton pump inhibitors, H2 blockers and antacids.   The patient denies any atypical chest pain, shortness of breath or palpitations.  The patient admits to occasional episodes of diaphoresis.  The patient denies any constipation or diarrhea.  The patient has 2 bowel movements a day.  The patient denies a change in bowel habits.  The patient denies a change in caliber of stool.  The patient denies having mucus discharge with the bowel movements.  The patient denies any bright red blood per rectum, melena or hematemesis.  The patient denies any rectal pain or rectal pruritus.  The patient complains of weight loss and anorexia.  The patient admits to losing 8 pounds over the past 3 months.  She denies any fevers or chills.  The patient had an upper endoscopy and colonoscopy to the terminal ileum performed at the [facility] endoscopy suite on August 15, 2019. The upper endoscopy was performed up to the level of the second portion of the duodenum. The upper endoscopy revealed mild diffuse gastritis with scattered gastric antral erosions. Biopsies were taken of the distal esophagus, antrum, body of stomach and duodenum to assess for esophagitis, gastritis and duodenitis. The pathology revealed distal esophagus with mild esophagitis, gastric antral and body mucosa with minimal to mild chronic gastritis that was negative for Helicobacter pylori and unremarkable small intestinal duodenal mucosa. The colonoscopy to the terminal ileum revealed scattered left sided diverticulosis, a long and tortuous colon and small internal hemorrhoids. Random biopsies were taken of the terminal ileum and cecum to assess for microscopic ileitis and colitis. There were no polyps, masses, AVMs or colitis noted. The pathology revealed unremarkable small intestinal mucosa (terminal ileum) and unremarkable colonic mucosa (cecum). The patient tolerated the procedures well.  The patient was recently evaluated at the [facility] on April 16, 2021 for abdominal pain.   The patient had blood work and imaging studies to assess the symptoms.  The blood work performed on April 16, 2021 revealed no evidence of anemia with a hemoglobin/hematocrit level of 14.3/42.5, respectively, normal liver enzymes with a total bilirubin of 0.2 mg/dL, normal alkaline phosphatase/AST/ALT of 84/21/30 U/L, respectively.  There was no evidence of prior exposure to hepatitis A, B. or C. the urinalysis performed on April 16, 2021 revealed moderate blood. The urine culture performed on April 17, 2021 revealed <10,000 CFU/ml normal urogenital yaquelin.  The CAT scan of the chest, abdomen and pelvis with IV contrast revealed incompletely visualized nodular breast parenchyma versus breast nodule in the left breast. 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  There is no downstream pelvic congestion.  There is no CT evidence of mesenteric ischemia. Also noted was atherosclerotic disease of the aortoiliac tree.  There is a moderate stenosis in the proximal celiac trunk and a mild stenosis at the SMA origin, without vascular occlusion. Also noted was mild sigmoid diverticulosis. The pelvic ultrasound performed on April 17, 2021 revealed unremarkable vaginal cuff. The study was limited by obscuring bowel gas with the bilateral ovaries not visualized.  The chest x-ray performed on performed on April 17, 2021 revealed clear lungs. The patient was observed with resolution of the symptoms and was discharged to followup in the office.  I reviewed the blood work and imaging studies performed in the emergency room. The patient admits to a family history of GI problems. The patient’s paternal aunt had a history of gastric and reflux issue.  [de-identified] : (-) smoking, (-) ETOH, (-) IVDA\par

## 2021-05-11 ENCOUNTER — OUTPATIENT (OUTPATIENT)
Dept: OUTPATIENT SERVICES | Facility: HOSPITAL | Age: 76
LOS: 1 days | Discharge: HOME | End: 2021-05-11

## 2021-05-11 DIAGNOSIS — Z90.49 ACQUIRED ABSENCE OF OTHER SPECIFIED PARTS OF DIGESTIVE TRACT: Chronic | ICD-10-CM

## 2021-05-11 DIAGNOSIS — Z90.710 ACQUIRED ABSENCE OF BOTH CERVIX AND UTERUS: Chronic | ICD-10-CM

## 2021-05-11 DIAGNOSIS — I82.890 ACUTE EMBOLISM AND THROMBOSIS OF OTHER SPECIFIED VEINS: ICD-10-CM

## 2021-05-13 ENCOUNTER — LABORATORY RESULT (OUTPATIENT)
Age: 76
End: 2021-05-13

## 2021-05-13 ENCOUNTER — RESULT REVIEW (OUTPATIENT)
Age: 76
End: 2021-05-13

## 2021-05-13 ENCOUNTER — APPOINTMENT (OUTPATIENT)
Dept: HEMATOLOGY ONCOLOGY | Facility: CLINIC | Age: 76
End: 2021-05-13
Payer: MEDICARE

## 2021-05-13 VITALS
SYSTOLIC BLOOD PRESSURE: 160 MMHG | DIASTOLIC BLOOD PRESSURE: 76 MMHG | BODY MASS INDEX: 25.15 KG/M2 | HEIGHT: 61.63 IN | RESPIRATION RATE: 16 BRPM | OXYGEN SATURATION: 97 % | WEIGHT: 136.66 LBS | TEMPERATURE: 97.2 F | HEART RATE: 88 BPM

## 2021-05-13 DIAGNOSIS — Z80.0 FAMILY HISTORY OF MALIGNANT NEOPLASM OF DIGESTIVE ORGANS: ICD-10-CM

## 2021-05-13 DIAGNOSIS — Z80.9 FAMILY HISTORY OF MALIGNANT NEOPLASM, UNSPECIFIED: ICD-10-CM

## 2021-05-13 DIAGNOSIS — Z82.69 FAMILY HISTORY OF OTHER DISEASES OF THE MUSCULOSKELETAL SYSTEM AND CONNECTIVE TISSUE: ICD-10-CM

## 2021-05-13 LAB
AT III PPP CHRO-ACNC: 138 %
BASOPHILS # BLD AUTO: 0.06 K/UL — SIGNIFICANT CHANGE UP (ref 0–0.2)
BASOPHILS NFR BLD AUTO: 1 % — SIGNIFICANT CHANGE UP (ref 0–2)
DEPRECATED D DIMER PPP IA-ACNC: <150 NG/ML DDU
EOSINOPHIL # BLD AUTO: 0.03 K/UL — SIGNIFICANT CHANGE UP (ref 0–0.5)
EOSINOPHIL NFR BLD AUTO: 0.5 % — SIGNIFICANT CHANGE UP (ref 0–6)
HCT VFR BLD CALC: 45.5 % — HIGH (ref 34.5–45)
HCYS SERPL-MCNC: 13.3 UMOL/L
HGB BLD-MCNC: 14.9 G/DL — SIGNIFICANT CHANGE UP (ref 11.5–15.5)
IMM GRANULOCYTES NFR BLD AUTO: 0.5 % — SIGNIFICANT CHANGE UP (ref 0–1.5)
LYMPHOCYTES # BLD AUTO: 1.11 K/UL — SIGNIFICANT CHANGE UP (ref 1–3.3)
LYMPHOCYTES # BLD AUTO: 19.1 % — SIGNIFICANT CHANGE UP (ref 13–44)
MCHC RBC-ENTMCNC: 30.3 PG — SIGNIFICANT CHANGE UP (ref 27–34)
MCHC RBC-ENTMCNC: 32.7 G/DL — SIGNIFICANT CHANGE UP (ref 32–36)
MCV RBC AUTO: 92.5 FL — SIGNIFICANT CHANGE UP (ref 80–100)
MONOCYTES # BLD AUTO: 0.29 K/UL — SIGNIFICANT CHANGE UP (ref 0–0.9)
MONOCYTES NFR BLD AUTO: 5 % — SIGNIFICANT CHANGE UP (ref 2–14)
NEUTROPHILS # BLD AUTO: 4.29 K/UL — SIGNIFICANT CHANGE UP (ref 1.8–7.4)
NEUTROPHILS NFR BLD AUTO: 73.9 % — SIGNIFICANT CHANGE UP (ref 43–77)
NRBC # BLD: 0 /100 WBCS — SIGNIFICANT CHANGE UP (ref 0–0)
PLATELET # BLD AUTO: 323 K/UL — SIGNIFICANT CHANGE UP (ref 150–400)
PROT C PPP CHRO-ACNC: 133 %
PROT S AG ACT/NOR PPP IA: 73 %
RBC # BLD: 4.92 M/UL — SIGNIFICANT CHANGE UP (ref 3.8–5.2)
RBC # FLD: 12.4 % — SIGNIFICANT CHANGE UP (ref 10.3–14.5)
WBC # BLD: 5.81 K/UL — SIGNIFICANT CHANGE UP (ref 3.8–10.5)
WBC # FLD AUTO: 5.81 K/UL — SIGNIFICANT CHANGE UP (ref 3.8–10.5)

## 2021-05-13 PROCEDURE — 99072 ADDL SUPL MATRL&STAF TM PHE: CPT

## 2021-05-13 PROCEDURE — 99205 OFFICE O/P NEW HI 60 MIN: CPT

## 2021-05-13 NOTE — HISTORY OF PRESENT ILLNESS
[FreeTextEntry1] : Apixaban 5 mg BID [de-identified] : Ms. LEVINE is referred for an initial hematologic consultation of a possible hypercoagulable state. She is a 76F originally from [facility], Citizen of [place]-speaking mostly, with past medical history notable for GERD, status post cholecystectomy, and status post hysterectomy.  On 4/16/2021 patient presented with crampy, intermittent midepigastric discomfort, mild to moderate in intensity, unrelated to passing gas or having bowel movements and not relieved by medications.  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  There was no associated downstream pelvic congestion, no CT evidence of mesenteric ischemia, present atherosclerotic disease of the aortoiliac tree with moderate stenosis in the proximal celiac trunk and a mild stenosis of the superior mesenteric artery origin, without vascular occlusion.  The spleen, SMV, and main portal vein are grossly patent.  An additional finding was a nodular breast parenchyma versus a breast nodule in the left breast.  Patient started anticoagulation with Apixaban BID.  Denies previous personal thrombosis as well as a negative family history of hypercoagulability.  Her GI symptoms have since resolved.  Denies any other constitutional symptoms. Accompanied by her daughter, Kayla.

## 2021-05-13 NOTE — ASSESSMENT
[FreeTextEntry1] : Ms. LEVINE 's questions were answered to her satisfaction. She  expressed her  understanding and willingness to comply with the above recommendations, and  will return to the office in 4 months.\par \par \par \par \par \par \par

## 2021-05-13 NOTE — CONSULT LETTER
[Consult Letter:] : I had the pleasure of evaluating your patient, [unfilled]. [Please see my note below.] : Please see my note below. [Consult Closing:] : Thank you very much for allowing me to participate in the care of this patient.  If you have any questions, please do not hesitate to contact me. [Sincerely,] : Sincerely, [Dear  ___] : Dear  [unfilled], [DrLeia  ___] : Dr. FRASER [FreeTextEntry2] : María Gallagher M.D. [FreeTextEntry3] : PARADISE ALMAGUER M.D.\par Hematology/ Oncology\par [facility] [place] \par [facility]\par [address]\par [place], [place] [address]\par Telephone: [phone]\par Fax: [phone]\par \par \par \par \par \par

## 2021-05-13 NOTE — REVIEW OF SYSTEMS
[Patient Intake Form Reviewed] : Patient intake form was reviewed [Negative] : Allergic/Immunologic [Anxiety] : anxiety [Depression] : depression

## 2021-05-14 LAB
B2 GLYCOPROT1 IGG SER-ACNC: <5 SGU
PROT S FREE PPP-ACNC: 101 %
SILICA CLOTTING TIME INTERPRETATION: NORMAL
SILICA CLOTTING TIME S/C: 0.94 RATIO

## 2021-05-16 LAB
B2 GLYCOPROT1 IGA SERPL IA-ACNC: <5 SAU
B2 GLYCOPROT1 IGM SER-ACNC: <5 SMU
CARDIOLIPIN IGM SER-MCNC: 6.8 MPL
CARDIOLIPIN IGM SER-MCNC: <5 GPL

## 2021-05-17 LAB
DNA PLOIDY SPEC FC-IMP: NORMAL
PTR INTERP: NORMAL

## 2021-05-19 LAB
APTT HEX PL PPP: NEGATIVE
HEX-1: 37.2 SECS
HEX-2: 35.9 SECS

## 2021-05-24 NOTE — ED PROVIDER NOTE - CLINICAL SUMMARY MEDICAL DECISION MAKING FREE TEXT BOX
Sheath removed intact.    Mei Chacon MD PGY-3 77 yo F with PMH HTN presents with epigastric abdominal pain, intermittent, worse with eating, over the last month, worse over the last 2 days. No abdominal pain on exam. Will get labs and CTA to eval for mesenteric ischemia, also DDx includes GERD/PUD. Will give pain control prn, dispo pending.

## 2021-06-02 ENCOUNTER — APPOINTMENT (OUTPATIENT)
Dept: GASTROENTEROLOGY | Facility: CLINIC | Age: 76
End: 2021-06-02

## 2021-08-02 ENCOUNTER — APPOINTMENT (OUTPATIENT)
Dept: GASTROENTEROLOGY | Facility: CLINIC | Age: 76
End: 2021-08-02

## 2021-09-20 ENCOUNTER — OUTPATIENT (OUTPATIENT)
Dept: OUTPATIENT SERVICES | Facility: HOSPITAL | Age: 76
LOS: 1 days | Discharge: HOME | End: 2021-09-20

## 2021-09-20 DIAGNOSIS — I82.90 ACUTE EMBOLISM AND THROMBOSIS OF UNSPECIFIED VEIN: ICD-10-CM

## 2021-09-20 DIAGNOSIS — Z90.49 ACQUIRED ABSENCE OF OTHER SPECIFIED PARTS OF DIGESTIVE TRACT: Chronic | ICD-10-CM

## 2021-09-20 DIAGNOSIS — Z90.710 ACQUIRED ABSENCE OF BOTH CERVIX AND UTERUS: Chronic | ICD-10-CM

## 2021-09-23 ENCOUNTER — APPOINTMENT (OUTPATIENT)
Age: 76
End: 2021-09-23
Payer: MEDICARE

## 2021-09-23 DIAGNOSIS — I82.890 ACUTE EMBOLISM AND THROMBOSIS OF OTHER SPECIFIED VEINS: ICD-10-CM

## 2021-09-23 PROCEDURE — 99443: CPT

## 2021-09-23 NOTE — REVIEW OF SYSTEMS
[Patient Intake Form Reviewed] : Patient intake form was reviewed [Anxiety] : anxiety [Depression] : depression [Negative] : Allergic/Immunologic [FreeTextEntry7] : lower abdominal discomfort in the morning, resolved through the day

## 2021-09-23 NOTE — ASSESSMENT
[FreeTextEntry1] : Ms. LEVINE 's questions were answered to her satisfaction. She  expressed her  understanding and willingness to comply with the above recommendations, and  will return to the office in 2 weeks.\par \par \par \par \par \par \par

## 2021-09-23 NOTE — RESULTS/DATA
[FreeTextEntry1] : I reviewed blood work results from April 2021 with patient and daughter, Kayla.\par \par \par

## 2021-10-09 ENCOUNTER — APPOINTMENT (OUTPATIENT)
Dept: CT IMAGING | Facility: CLINIC | Age: 76
End: 2021-10-09
Payer: MEDICARE

## 2021-10-09 ENCOUNTER — OUTPATIENT (OUTPATIENT)
Dept: OUTPATIENT SERVICES | Facility: HOSPITAL | Age: 76
LOS: 1 days | End: 2021-10-09
Payer: COMMERCIAL

## 2021-10-09 DIAGNOSIS — Z90.710 ACQUIRED ABSENCE OF BOTH CERVIX AND UTERUS: Chronic | ICD-10-CM

## 2021-10-09 DIAGNOSIS — I82.890 ACUTE EMBOLISM AND THROMBOSIS OF OTHER SPECIFIED VEINS: ICD-10-CM

## 2021-10-09 DIAGNOSIS — Z90.49 ACQUIRED ABSENCE OF OTHER SPECIFIED PARTS OF DIGESTIVE TRACT: Chronic | ICD-10-CM

## 2021-10-09 PROCEDURE — 82565 ASSAY OF CREATININE: CPT

## 2021-10-09 PROCEDURE — 74174 CTA ABD&PLVS W/CONTRAST: CPT | Mod: 26

## 2021-10-09 PROCEDURE — 74174 CTA ABD&PLVS W/CONTRAST: CPT

## 2021-10-13 ENCOUNTER — NON-APPOINTMENT (OUTPATIENT)
Age: 76
End: 2021-10-13

## 2023-04-11 NOTE — HISTORY OF PRESENT ILLNESS
Encounter addended by: Terry Singh LADC on: 4/11/2023 1:38 PM   Actions taken: Charge Capture section accepted [Home] : at home, [unfilled] , at the time of the visit. [Medical Office: ([facility])___] : at the medical office located in  [Family Member] : family member [Verbal consent obtained from patient] : the patient, [unfilled] [de-identified] : 76F originally from [facility], [place]-speaking mostly, with PMHx c/w GERD, s/p cholecystectomy, s/p hysterectomy for hematologic follow-up of hypercoagulable state/ ovarian vein thrombosis. [FreeTextEntry1] : Apixaban 5 mg BID [de-identified] : Telephone visit ; participating is patient's daughter, Kayla.  Patient is complaining of abdominal discomfort, mostly in the pelvic area in the morning, unclear etiology, but improved as the day goes on.  She is compliant with Eliquis, and denies bleeding diathesis.  On 4/16/2021 CT of the abdomen/pelvis showed a multifocal nonocclusive thrombus in the right ovarian vein.  Subsequent hypercoagulable work-up failed to indicate a primary thrombophilic state (factor V Leiden and prothrombin gene mutations testing was negative).  An additional finding was a nodule in the left breast.  She reports no new constitutional symptoms, and denies new medications.  Appetite reportedly preserved.\par
